# Patient Record
Sex: FEMALE | Race: WHITE | NOT HISPANIC OR LATINO | Employment: FULL TIME | URBAN - METROPOLITAN AREA
[De-identification: names, ages, dates, MRNs, and addresses within clinical notes are randomized per-mention and may not be internally consistent; named-entity substitution may affect disease eponyms.]

---

## 2018-03-06 ENCOUNTER — HOSPITAL ENCOUNTER (OUTPATIENT)
Dept: MAMMOGRAPHY | Facility: CLINIC | Age: 23
Discharge: HOME | End: 2018-03-06
Attending: OBSTETRICS & GYNECOLOGY | Admitting: OBSTETRICS & GYNECOLOGY
Payer: COMMERCIAL

## 2018-03-06 DIAGNOSIS — N63.10 LUMP OF RIGHT BREAST: ICD-10-CM

## 2018-03-06 PROCEDURE — 76642 ULTRASOUND BREAST LIMITED: CPT | Mod: RT

## 2020-03-06 LAB
BLD GP AB SCN SERPL QL: NORMAL
HBV SURFACE AG SERPL QL IA: NON REACTIVE
HIV 1+2 AB+HIV1 P24 AG SERPL QL IA: NORMAL
RUBELLA ANTIBODY IGG QUANTITATIVE: NORMAL IU/ML

## 2020-07-15 LAB — GROUP B STREP PCR: NEGATIVE

## 2020-07-20 DIAGNOSIS — Z34.90 ENCOUNTER FOR INDUCTION OF LABOR: ICD-10-CM

## 2020-07-20 PROCEDURE — U0003 INFECTIOUS AGENT DETECTION BY NUCLEIC ACID (DNA OR RNA); SEVERE ACUTE RESPIRATORY SYNDROME CORONAVIRUS 2 (SARS-COV-2) (CORONAVIRUS DISEASE [COVID-19]), AMPLIFIED PROBE TECHNIQUE, MAKING USE OF HIGH THROUGHPUT TECHNOLOGIES AS DESCRIBED BY CMS-2020-01-R: HCPCS | Performed by: PHYSICIAN ASSISTANT

## 2020-07-21 LAB
SARS-COV-2 RNA SPEC QL NAA+PROBE: NOT DETECTED
SPECIMEN SOURCE: NORMAL

## 2020-07-23 ENCOUNTER — HOSPITAL ENCOUNTER (INPATIENT)
Facility: CLINIC | Age: 25
LOS: 2 days | Discharge: HOME | End: 2020-07-26
Attending: OBSTETRICS & GYNECOLOGY | Admitting: OBSTETRICS & GYNECOLOGY
Payer: COMMERCIAL

## 2020-07-23 LAB
ABO + RH BLD: NORMAL
ABO + RH BLD: NORMAL
AMPHETAMINES UR QL SCN: NEGATIVE
CANNABINOIDS UR QL: NEGATIVE
COCAINE UR QL: NEGATIVE
OPIATES UR QL SCN: NEGATIVE
PCP UR QL SCN: NEGATIVE
SPECIMEN EXP DATE BLD: NORMAL

## 2020-07-23 PROCEDURE — 86780 TREPONEMA PALLIDUM: CPT | Performed by: PHYSICIAN ASSISTANT

## 2020-07-23 PROCEDURE — U0003 INFECTIOUS AGENT DETECTION BY NUCLEIC ACID (DNA OR RNA); SEVERE ACUTE RESPIRATORY SYNDROME CORONAVIRUS 2 (SARS-COV-2) (CORONAVIRUS DISEASE [COVID-19]), AMPLIFIED PROBE TECHNIQUE, MAKING USE OF HIGH THROUGHPUT TECHNOLOGIES AS DESCRIBED BY CMS-2020-01-R: HCPCS | Performed by: OBSTETRICS & GYNECOLOGY

## 2020-07-23 PROCEDURE — 86900 BLOOD TYPING SEROLOGIC ABO: CPT | Performed by: PHYSICIAN ASSISTANT

## 2020-07-23 PROCEDURE — 86592 SYPHILIS TEST NON-TREP QUAL: CPT | Performed by: PHYSICIAN ASSISTANT

## 2020-07-23 PROCEDURE — 36415 COLL VENOUS BLD VENIPUNCTURE: CPT | Performed by: PHYSICIAN ASSISTANT

## 2020-07-23 PROCEDURE — 80307 DRUG TEST PRSMV CHEM ANLYZR: CPT | Performed by: OBSTETRICS & GYNECOLOGY

## 2020-07-23 PROCEDURE — 86901 BLOOD TYPING SEROLOGIC RH(D): CPT | Performed by: PHYSICIAN ASSISTANT

## 2020-07-23 PROCEDURE — 25000132 ZZH RX MED GY IP 250 OP 250 PS 637: Performed by: PHYSICIAN ASSISTANT

## 2020-07-23 RX ORDER — IBUPROFEN 400 MG/1
800 TABLET, FILM COATED ORAL
Status: DISCONTINUED | OUTPATIENT
Start: 2020-07-23 | End: 2020-07-24

## 2020-07-23 RX ORDER — NALOXONE HYDROCHLORIDE 0.4 MG/ML
.1-.4 INJECTION, SOLUTION INTRAMUSCULAR; INTRAVENOUS; SUBCUTANEOUS
Status: DISCONTINUED | OUTPATIENT
Start: 2020-07-23 | End: 2020-07-24

## 2020-07-23 RX ORDER — ESCITALOPRAM OXALATE 5 MG/1
TABLET ORAL
COMMUNITY
Start: 2020-05-06

## 2020-07-23 RX ORDER — VITAMIN A ACETATE, .BETA.-CAROTENE, ASCORBIC ACID, CHOLECALCIFEROL, .ALPHA.-TOCOPHEROL ACETATE, DL-, THIAMINE MONONITRATE, RIBOFLAVIN, NIACINAMIDE, PYRIDOXINE HYDROCHLORIDE, FOLIC ACID, CYANOCOBALAMIN, CALCIUM CARBONATE, FERROUS FUMARATE, ZINC OXIDE, AND CUPRIC OXIDE 2000; 2000; 120; 400; 22; 1.84; 3; 20; 10; 1; 12; 200; 27; 25; 2 [IU]/1; [IU]/1; MG/1; [IU]/1; MG/1; MG/1; MG/1; MG/1; MG/1; MG/1; UG/1; MG/1; MG/1; MG/1; MG/1
1 TABLET ORAL DAILY
COMMUNITY

## 2020-07-23 RX ORDER — TRANEXAMIC ACID 10 MG/ML
1 INJECTION, SOLUTION INTRAVENOUS EVERY 30 MIN PRN
Status: DISCONTINUED | OUTPATIENT
Start: 2020-07-23 | End: 2020-07-24

## 2020-07-23 RX ORDER — VALACYCLOVIR HYDROCHLORIDE 500 MG/1
500 TABLET, FILM COATED ORAL DAILY
Status: DISCONTINUED | OUTPATIENT
Start: 2020-07-24 | End: 2020-07-26 | Stop reason: HOSPADM

## 2020-07-23 RX ORDER — OXYCODONE AND ACETAMINOPHEN 5; 325 MG/1; MG/1
1 TABLET ORAL
Status: DISCONTINUED | OUTPATIENT
Start: 2020-07-23 | End: 2020-07-24

## 2020-07-23 RX ORDER — METHYLERGONOVINE MALEATE 0.2 MG/ML
200 INJECTION INTRAVENOUS
Status: DISCONTINUED | OUTPATIENT
Start: 2020-07-23 | End: 2020-07-24

## 2020-07-23 RX ORDER — VALACYCLOVIR HYDROCHLORIDE 500 MG/1
TABLET, FILM COATED ORAL
COMMUNITY
Start: 2020-01-02

## 2020-07-23 RX ORDER — ZOLPIDEM TARTRATE 5 MG/1
5 TABLET ORAL
Status: DISCONTINUED | OUTPATIENT
Start: 2020-07-23 | End: 2020-07-24

## 2020-07-23 RX ORDER — CARBOPROST TROMETHAMINE 250 UG/ML
250 INJECTION, SOLUTION INTRAMUSCULAR
Status: DISCONTINUED | OUTPATIENT
Start: 2020-07-23 | End: 2020-07-24

## 2020-07-23 RX ORDER — SODIUM CHLORIDE, SODIUM LACTATE, POTASSIUM CHLORIDE, CALCIUM CHLORIDE 600; 310; 30; 20 MG/100ML; MG/100ML; MG/100ML; MG/100ML
INJECTION, SOLUTION INTRAVENOUS CONTINUOUS
Status: DISCONTINUED | OUTPATIENT
Start: 2020-07-23 | End: 2020-07-24

## 2020-07-23 RX ORDER — OXYTOCIN/0.9 % SODIUM CHLORIDE 30/500 ML
100-340 PLASTIC BAG, INJECTION (ML) INTRAVENOUS CONTINUOUS PRN
Status: DISCONTINUED | OUTPATIENT
Start: 2020-07-23 | End: 2020-07-24

## 2020-07-23 RX ORDER — ONDANSETRON 2 MG/ML
4 INJECTION INTRAMUSCULAR; INTRAVENOUS EVERY 6 HOURS PRN
Status: DISCONTINUED | OUTPATIENT
Start: 2020-07-23 | End: 2020-07-24

## 2020-07-23 RX ORDER — OXYTOCIN 10 [USP'U]/ML
10 INJECTION, SOLUTION INTRAMUSCULAR; INTRAVENOUS
Status: DISCONTINUED | OUTPATIENT
Start: 2020-07-23 | End: 2020-07-24

## 2020-07-23 RX ORDER — ACETAMINOPHEN 325 MG/1
650 TABLET ORAL EVERY 4 HOURS PRN
Status: DISCONTINUED | OUTPATIENT
Start: 2020-07-23 | End: 2020-07-24

## 2020-07-23 RX ADMIN — DINOPROSTONE 10 MG: 10 INSERT VAGINAL at 21:24

## 2020-07-23 ASSESSMENT — MIFFLIN-ST. JEOR: SCORE: 1527.86

## 2020-07-24 ENCOUNTER — ANESTHESIA EVENT (OUTPATIENT)
Dept: OBGYN | Facility: CLINIC | Age: 25
End: 2020-07-24
Payer: COMMERCIAL

## 2020-07-24 ENCOUNTER — ANESTHESIA (OUTPATIENT)
Dept: OBGYN | Facility: CLINIC | Age: 25
End: 2020-07-24
Payer: COMMERCIAL

## 2020-07-24 LAB
LABORATORY COMMENT REPORT: NORMAL
RPR SER QL: NONREACTIVE
SARS-COV-2 RNA SPEC QL NAA+PROBE: NEGATIVE
SARS-COV-2 RNA SPEC QL NAA+PROBE: NORMAL
SPECIMEN SOURCE: NORMAL
SPECIMEN SOURCE: NORMAL
T PALLIDUM AB SER QL: REACTIVE

## 2020-07-24 PROCEDURE — 25800030 ZZH RX IP 258 OP 636: Performed by: ANESTHESIOLOGY

## 2020-07-24 PROCEDURE — 3E0P7VZ INTRODUCTION OF HORMONE INTO FEMALE REPRODUCTIVE, VIA NATURAL OR ARTIFICIAL OPENING: ICD-10-PCS | Performed by: OBSTETRICS & GYNECOLOGY

## 2020-07-24 PROCEDURE — 00HU33Z INSERTION OF INFUSION DEVICE INTO SPINAL CANAL, PERCUTANEOUS APPROACH: ICD-10-PCS | Performed by: ANESTHESIOLOGY

## 2020-07-24 PROCEDURE — 72200001 ZZH LABOR CARE VAGINAL DELIVERY SINGLE

## 2020-07-24 PROCEDURE — 0KQM0ZZ REPAIR PERINEUM MUSCLE, OPEN APPROACH: ICD-10-PCS | Performed by: OBSTETRICS & GYNECOLOGY

## 2020-07-24 PROCEDURE — 25000128 H RX IP 250 OP 636: Performed by: ANESTHESIOLOGY

## 2020-07-24 PROCEDURE — 10907ZC DRAINAGE OF AMNIOTIC FLUID, THERAPEUTIC FROM PRODUCTS OF CONCEPTION, VIA NATURAL OR ARTIFICIAL OPENING: ICD-10-PCS | Performed by: OBSTETRICS & GYNECOLOGY

## 2020-07-24 PROCEDURE — 3E0R3BZ INTRODUCTION OF ANESTHETIC AGENT INTO SPINAL CANAL, PERCUTANEOUS APPROACH: ICD-10-PCS | Performed by: ANESTHESIOLOGY

## 2020-07-24 PROCEDURE — 25000125 ZZHC RX 250: Performed by: OBSTETRICS & GYNECOLOGY

## 2020-07-24 PROCEDURE — 25000128 H RX IP 250 OP 636: Performed by: PHYSICIAN ASSISTANT

## 2020-07-24 PROCEDURE — 25000125 ZZHC RX 250: Performed by: PHYSICIAN ASSISTANT

## 2020-07-24 PROCEDURE — 25000132 ZZH RX MED GY IP 250 OP 250 PS 637: Performed by: OBSTETRICS & GYNECOLOGY

## 2020-07-24 PROCEDURE — 37000011 ZZH ANESTHESIA WARD SERVICE

## 2020-07-24 PROCEDURE — 12000035 ZZH R&B POSTPARTUM

## 2020-07-24 PROCEDURE — 25000125 ZZHC RX 250: Performed by: ANESTHESIOLOGY

## 2020-07-24 RX ORDER — OXYTOCIN/0.9 % SODIUM CHLORIDE 30/500 ML
100 PLASTIC BAG, INJECTION (ML) INTRAVENOUS CONTINUOUS
Status: DISCONTINUED | OUTPATIENT
Start: 2020-07-24 | End: 2020-07-26 | Stop reason: HOSPADM

## 2020-07-24 RX ORDER — ROPIVACAINE HYDROCHLORIDE 2 MG/ML
10 INJECTION, SOLUTION EPIDURAL; INFILTRATION; PERINEURAL ONCE
Status: COMPLETED | OUTPATIENT
Start: 2020-07-24 | End: 2020-07-24

## 2020-07-24 RX ORDER — LIDOCAINE 40 MG/G
CREAM TOPICAL
Status: DISCONTINUED | OUTPATIENT
Start: 2020-07-24 | End: 2020-07-24

## 2020-07-24 RX ORDER — TRANEXAMIC ACID 10 MG/ML
1 INJECTION, SOLUTION INTRAVENOUS EVERY 30 MIN PRN
Status: DISCONTINUED | OUTPATIENT
Start: 2020-07-24 | End: 2020-07-26 | Stop reason: HOSPADM

## 2020-07-24 RX ORDER — ONDANSETRON 4 MG/1
4 TABLET, ORALLY DISINTEGRATING ORAL EVERY 6 HOURS PRN
Status: DISCONTINUED | OUTPATIENT
Start: 2020-07-24 | End: 2020-07-24

## 2020-07-24 RX ORDER — CARBOPROST TROMETHAMINE 250 UG/ML
250 INJECTION, SOLUTION INTRAMUSCULAR
Status: DISCONTINUED | OUTPATIENT
Start: 2020-07-24 | End: 2020-07-26 | Stop reason: HOSPADM

## 2020-07-24 RX ORDER — MISOPROSTOL 200 UG/1
800 TABLET ORAL
Status: DISCONTINUED | OUTPATIENT
Start: 2020-07-24 | End: 2020-07-26 | Stop reason: HOSPADM

## 2020-07-24 RX ORDER — OXYTOCIN/0.9 % SODIUM CHLORIDE 30/500 ML
340 PLASTIC BAG, INJECTION (ML) INTRAVENOUS CONTINUOUS PRN
Status: DISCONTINUED | OUTPATIENT
Start: 2020-07-24 | End: 2020-07-26 | Stop reason: HOSPADM

## 2020-07-24 RX ORDER — AMOXICILLIN 250 MG
1 CAPSULE ORAL 2 TIMES DAILY
Status: DISCONTINUED | OUTPATIENT
Start: 2020-07-24 | End: 2020-07-26 | Stop reason: HOSPADM

## 2020-07-24 RX ORDER — IBUPROFEN 400 MG/1
800 TABLET, FILM COATED ORAL EVERY 6 HOURS PRN
Status: DISCONTINUED | OUTPATIENT
Start: 2020-07-24 | End: 2020-07-26 | Stop reason: HOSPADM

## 2020-07-24 RX ORDER — CALCIUM CARBONATE 500 MG/1
1000 TABLET, CHEWABLE ORAL 3 TIMES DAILY PRN
Status: DISCONTINUED | OUTPATIENT
Start: 2020-07-24 | End: 2020-07-26 | Stop reason: HOSPADM

## 2020-07-24 RX ORDER — EPHEDRINE SULFATE 50 MG/ML
5 INJECTION, SOLUTION INTRAMUSCULAR; INTRAVENOUS; SUBCUTANEOUS
Status: DISCONTINUED | OUTPATIENT
Start: 2020-07-24 | End: 2020-07-24

## 2020-07-24 RX ORDER — NALOXONE HYDROCHLORIDE 0.4 MG/ML
.1-.4 INJECTION, SOLUTION INTRAMUSCULAR; INTRAVENOUS; SUBCUTANEOUS
Status: DISCONTINUED | OUTPATIENT
Start: 2020-07-24 | End: 2020-07-26 | Stop reason: HOSPADM

## 2020-07-24 RX ORDER — ONDANSETRON 2 MG/ML
4 INJECTION INTRAMUSCULAR; INTRAVENOUS EVERY 6 HOURS PRN
Status: DISCONTINUED | OUTPATIENT
Start: 2020-07-24 | End: 2020-07-24

## 2020-07-24 RX ORDER — OXYTOCIN/0.9 % SODIUM CHLORIDE 30/500 ML
1-24 PLASTIC BAG, INJECTION (ML) INTRAVENOUS CONTINUOUS
Status: DISCONTINUED | OUTPATIENT
Start: 2020-07-24 | End: 2020-07-24

## 2020-07-24 RX ORDER — HYDROCORTISONE 2.5 %
CREAM (GRAM) TOPICAL 3 TIMES DAILY PRN
Status: DISCONTINUED | OUTPATIENT
Start: 2020-07-24 | End: 2020-07-26 | Stop reason: HOSPADM

## 2020-07-24 RX ORDER — BISACODYL 10 MG
10 SUPPOSITORY, RECTAL RECTAL DAILY PRN
Status: DISCONTINUED | OUTPATIENT
Start: 2020-07-26 | End: 2020-07-26 | Stop reason: HOSPADM

## 2020-07-24 RX ORDER — NALBUPHINE HYDROCHLORIDE 10 MG/ML
2.5-5 INJECTION, SOLUTION INTRAMUSCULAR; INTRAVENOUS; SUBCUTANEOUS EVERY 6 HOURS PRN
Status: DISCONTINUED | OUTPATIENT
Start: 2020-07-24 | End: 2020-07-24

## 2020-07-24 RX ORDER — METHYLERGONOVINE MALEATE 0.2 MG/ML
200 INJECTION INTRAVENOUS
Status: DISCONTINUED | OUTPATIENT
Start: 2020-07-24 | End: 2020-07-26 | Stop reason: HOSPADM

## 2020-07-24 RX ORDER — ACETAMINOPHEN 325 MG/1
650 TABLET ORAL EVERY 4 HOURS PRN
Status: DISCONTINUED | OUTPATIENT
Start: 2020-07-24 | End: 2020-07-26 | Stop reason: HOSPADM

## 2020-07-24 RX ORDER — AMOXICILLIN 250 MG
2 CAPSULE ORAL 2 TIMES DAILY
Status: DISCONTINUED | OUTPATIENT
Start: 2020-07-24 | End: 2020-07-26 | Stop reason: HOSPADM

## 2020-07-24 RX ORDER — LIDOCAINE HYDROCHLORIDE AND EPINEPHRINE 15; 5 MG/ML; UG/ML
INJECTION, SOLUTION EPIDURAL PRN
Status: DISCONTINUED | OUTPATIENT
Start: 2020-07-24 | End: 2020-07-25 | Stop reason: HOSPADM

## 2020-07-24 RX ORDER — OXYTOCIN 10 [USP'U]/ML
10 INJECTION, SOLUTION INTRAMUSCULAR; INTRAVENOUS
Status: DISCONTINUED | OUTPATIENT
Start: 2020-07-24 | End: 2020-07-26 | Stop reason: HOSPADM

## 2020-07-24 RX ORDER — MODIFIED LANOLIN
OINTMENT (GRAM) TOPICAL
Status: DISCONTINUED | OUTPATIENT
Start: 2020-07-24 | End: 2020-07-26 | Stop reason: HOSPADM

## 2020-07-24 RX ORDER — NALOXONE HYDROCHLORIDE 0.4 MG/ML
.1-.4 INJECTION, SOLUTION INTRAMUSCULAR; INTRAVENOUS; SUBCUTANEOUS
Status: DISCONTINUED | OUTPATIENT
Start: 2020-07-24 | End: 2020-07-24

## 2020-07-24 RX ADMIN — LIDOCAINE HYDROCHLORIDE AND EPINEPHRINE 3 ML: 15; 5 INJECTION, SOLUTION EPIDURAL at 09:09

## 2020-07-24 RX ADMIN — Medication 12 ML/HR: at 09:04

## 2020-07-24 RX ADMIN — ACETAMINOPHEN 650 MG: 325 TABLET, FILM COATED ORAL at 21:23

## 2020-07-24 RX ADMIN — CALCIUM CARBONATE (ANTACID) CHEW TAB 500 MG 1000 MG: 500 CHEW TAB at 14:43

## 2020-07-24 RX ADMIN — Medication 100 ML/HR: at 17:33

## 2020-07-24 RX ADMIN — DOCUSATE SODIUM 50 MG AND SENNOSIDES 8.6 MG 1 TABLET: 8.6; 5 TABLET, FILM COATED ORAL at 21:23

## 2020-07-24 RX ADMIN — ROPIVACAINE HYDROCHLORIDE 10 ML: 2 INJECTION, SOLUTION EPIDURAL; INFILTRATION at 09:14

## 2020-07-24 RX ADMIN — IBUPROFEN 800 MG: 400 TABLET ORAL at 17:32

## 2020-07-24 RX ADMIN — ONDANSETRON 4 MG: 2 INJECTION INTRAMUSCULAR; INTRAVENOUS at 13:18

## 2020-07-24 RX ADMIN — ONDANSETRON 4 MG: 2 INJECTION INTRAMUSCULAR; INTRAVENOUS at 07:05

## 2020-07-24 RX ADMIN — SODIUM CHLORIDE, POTASSIUM CHLORIDE, SODIUM LACTATE AND CALCIUM CHLORIDE 1000 ML: 600; 310; 30; 20 INJECTION, SOLUTION INTRAVENOUS at 09:03

## 2020-07-24 RX ADMIN — VALACYCLOVIR HYDROCHLORIDE 500 MG: 500 TABLET, FILM COATED ORAL at 09:28

## 2020-07-24 RX ADMIN — Medication 2 MILLI-UNITS/MIN: at 13:02

## 2020-07-24 RX ADMIN — ACETAMINOPHEN 650 MG: 325 TABLET, FILM COATED ORAL at 17:32

## 2020-07-24 NOTE — PLAN OF CARE
Pitocin initiated at approx 1300 for abnormal uterine contraction pattern with pt verbal consent. Pt verbalized understanding of plan of care. Cont to monitor and assess.

## 2020-07-24 NOTE — ANESTHESIA PROCEDURE NOTES
Procedure note : epidural catheter  Staff -   Anesthesiologist:  Bola Fuentes MD      Performed By: anesthesiologist        Pre-Procedure  Performed by Bola Fuentes MD  Location: OB    Procedure Times:7/24/2020 8:53 AM and 7/24/2020 9:14 AM  Pre-Anesthestic Checklist: patient identified, IV checked, risks and benefits discussed, informed consent, monitors and equipment checked, pre-op evaluation and at physician/surgeon's request    Timeout  Correct Patient: Yes   Correct Procedure: Yes   Correct Site: Yes   Correct Laterality: N/A   Correct Position: Yes   Site Marked: N/A   .   Procedure Documentation    Diagnosis:Labor Pain.    Procedure: epidural catheter, .   Patient Position:sitting Insertion Site:L3-4  (midline approach) Injection technique: LORT saline   Local skin infiltrated with 3 mL of 1% lidocaine.      Patient Prep/Sterile Barriers; mask, sterile gloves, chlorhexidine gluconate and isopropyl alcohol, patient draped.  .  Needle: Touhy needle   Needle Gauge: 17.    Needle Length (Inches) 3.5   # of attempts: 1 and # of redirects: : 0. .    Catheter: 19 G . .  Catheter threaded easily  4 cm epidural space.  12 cm at skin.   .    Assessment/Narrative  Paresthesias: No.  .  .  Aspiration negative for heme or CSF  . Test dose of 3 mL lidocaine 1.5% w/ 1:200,000 epinephrine at. Test dose negative for signs of intravascular, subdural or intrathecal injection. Comments:  Pt tolerated well.    Returned to supine with JEAN CLAUDE post-procedure.   FHTs checked and stable post-procedure.    PCEA Infusion verified with OB RN prior to leaving room.

## 2020-07-24 NOTE — PLAN OF CARE
Assumed care of patient at approx 0715. Pt breathing through uterine contractions, up ad lissy in room, utilizing birthing ball and standing at bedside. Dr Werner at bedside at 0820 for assessment and AROM. Moderate amount clear fluid noted. SVE 3/80/-2, posterior. Pt requesting epidural and becoming more uncomfortable. Bolus started. Dr Fuentes at bedside for epidural placement. Pt tolerated well and received good pain relief. Guerrero cath placed, clear urine output.  SVE at 1030, 4-5/80/-1. Mother supportive at bedside. Dr Werner updated at 1045 re: SVE and reactive treponema antibody lab result. Cont to monitor and assess.

## 2020-07-24 NOTE — H&P
"Labor and delivery admit note.  HPI  Cierra Suazo  is a 24 year old   who was admitted with for IOL secondary to polyhydramnios at term.     Estimated Date of Delivery: 2020  39w3d  Prenatal care - early and adequate with Dr. Werner, dated by LMP c/w first trimester USG    Prenatal course - complicated by polyhydramnios    Prenatal labs  Blood type O, Rh positive  HIV-negative  Hepatitis BsAg- negative  Trep AB- Negative  Rubella- Immune  Pap- normal  GC/Chlamydia- negative  Quad screen- normal  Glucola- normal   GBS- negative.    Past Medical History:   Diagnosis Date     Depressive disorder     anxiety     Herpes genitalia      Past Surgical History:   Procedure Laterality Date     BREAST SURGERY  2019    augmentation     Social History     Tobacco Use     Smoking status: Never Smoker     Smokeless tobacco: Never Used   Substance Use Topics     Alcohol use: Not Currently     Drug use: Not Currently       Objective  Alert and oriented  /83   Pulse (!) 46   Temp 99.9  F (37.7  C) (Temporal)   Resp 14   Ht 1.676 m (5' 6\")   Wt 77.1 kg (170 lb)   SpO2 98%   BMI 27.44 kg/m    Heart and Lungs- normal  PA- uterus full term.   FHR- 130 baseline, good variability, accels +. No decels  Pelvic ( by admitting RN )  Cx: closed/long/high    Assessment/Plan:   at 39 weeks - IOL for polyhydramnios  Uneventful prenatal course.    Cervical ripening and AROM/pitocin  Routine intrapartum management.    Jailyn Werner MD  "

## 2020-07-24 NOTE — ANESTHESIA PREPROCEDURE EVALUATION
"Anesthesia Pre-Procedure Evaluation    Patient: Cierra Suazo   MRN: [id number] : 1995          Preoperative Diagnosis: * No surgery found *        Past Medical History:   Diagnosis Date     Depressive disorder     anxiety     Herpes genitalia      Past Surgical History:   Procedure Laterality Date     BREAST SURGERY  2019    augmentation       Anesthesia Evaluation     .             ROS/MED HX    ENT/Pulmonary:  - neg pulmonary ROS     Neurologic:  - neg neurologic ROS     Cardiovascular:  - neg cardiovascular ROS       METS/Exercise Tolerance:     Hematologic:  - neg hematologic  ROS       Musculoskeletal:  - neg musculoskeletal ROS       GI/Hepatic:  - neg GI/hepatic ROS       Renal/Genitourinary:  - ROS Renal section negative       Endo:  - neg endo ROS       Psychiatric:  - neg psychiatric ROS       Infectious Disease:         Malignancy:         Other:                          Physical Exam      Airway   Mallampati: II    Dental     Cardiovascular       Pulmonary             No results found for: WBC, HGB, HCT, PLT, CRP, SED, NA, POTASSIUM, CHLORIDE, CO2, BUN, CR, GLC, ALEXUS, PHOS, MAG, ALBUMIN, PROTTOTAL, ALT, AST, GGT, ALKPHOS, BILITOTAL, BILIDIRECT, LIPASE, AMYLASE, MEGHNA, PTT, INR, FIBR, TSH, T4, T3, HCG, HCGS, CKTOTAL, CKMB, TROPN    Preop Vitals  BP Readings from Last 3 Encounters:   20 109/76    Pulse Readings from Last 3 Encounters:   20 62      Resp Readings from Last 3 Encounters:   20 14    SpO2 Readings from Last 3 Encounters:   No data found for SpO2      Temp Readings from Last 1 Encounters:   20 36.2  C (97.2  F) (Temporal)    Ht Readings from Last 1 Encounters:   20 1.676 m (5' 6\")      Wt Readings from Last 1 Encounters:   20 77.1 kg (170 lb)    Estimated body mass index is 27.44 kg/m  as calculated from the following:    Height as of this encounter: 1.676 m (5' 6\").    Weight as of this encounter: 77.1 kg (170 lb).       Anesthesia " Plan      History & Physical Review      ASA Status:  2 .  OB Epidural Asa: 2       Plan for Epidural            Postoperative Care      Consents  Anesthetic plan, risks, benefits and alternatives discussed with:  Patient..                 Bola Fuentes MD

## 2020-07-24 NOTE — L&D DELIVERY NOTE
Admission date: 2020  Delivery date:  20  Place of delivery: [facility]    The patient is a 24 year old  at 39w3d  wks gestation admitted to labor and delivery for IOL secondary to polyhydramnios.  Her  course was otherwise uncomplicated.  The estimate fetal weight is 7.5#.      She received cervidil for cervical ripening.  She had AROM for clear fluid at 8:30 am,  For pain management she had an epidural with good relief.  Pitocin was titrated per protocol.       She progressed to complete dilation at 3:30 am.  She had excellent maternal expulsive efforts, and at 4:29 pm she delivered a viable male infant weighing (not yet weighed) with Apgars of 7 at 1 min and 8 at 5 minutes, via a normal spontaneous vaginal delivery over an intact perineum.  The infant was vigorous, and mouth and nose were bulb suctioned upon delivery.  The infant was handed off to his parents and the nursery team in attendance.    The placenta delivered spontaneously and intact.  The uterus was made firm with IV pitocin and uterine massage.  The estimated blood loss was 150 mL.    The cervix and vagina were inspected.  There was a  second degree perineal laceration which was repaired with 3-0 vicryl assuring hemostasis and close approximation.  The patient tolerated this well.     During labor the fetal heart tones were category 2.    Mother and baby did well and went to normal postpartum and  nursery.    GBS was negative.     Jailyn Werner MD

## 2020-07-24 NOTE — PROVIDER NOTIFICATION
07/23/20 2050   Provider Notification   Provider Name/Title Dr Werner   Method of Notification Electronic Page   Notification Reason Patient Arrived     Dr Werner called the unit and spoke to this RN. She is the patients primary MD and is familiar with the patient. Updated her on information including but not limited to: the patients contraction pattern and FHT.   Orders that were signed and held were released. Additional orders obtained to saline lock the patient overnight while cervical ripening and valtrex for the patient to take in the AM while she is still pregnant.   Will page MD overnight if needed.

## 2020-07-24 NOTE — PLAN OF CARE
Data: Patient admitted to room 214 at 1942. Patient is a . Prenatal record reviewed. Her medical history includes anxiety (reports not currently taking medication for it), genital herpes (taking valtrex preventatively), and a breast augmentation.   Gestational age 39w2d. Vital signs per doc flowsheet. Fetal movement present. Patient reports Induction Of Labor (cervical ripening)   as reason for admission. Her mother will be her support person and will be here tomorrow morning.   Action:  Verbal consent for EFM, external fetal monitors applied. Admission assessment completed. Patient and support persons educated on labor process. Patient instructed to report change in fetal movement, contractions, vaginal leaking of fluid or bleeding, abdominal pain, or any concerns related to the pregnancy to her nurse/physician. Patient oriented to room, call light in reach.   Response: Dr. Werner informed of patients arrival. Plan per provider is cervidil. Patient verbalized understanding of education and verbalized agreement with plan. Patient plans to get an epidural for pain management when the time comes.

## 2020-07-25 PROCEDURE — 12000035 ZZH R&B POSTPARTUM

## 2020-07-25 PROCEDURE — 25000132 ZZH RX MED GY IP 250 OP 250 PS 637: Performed by: OBSTETRICS & GYNECOLOGY

## 2020-07-25 RX ORDER — ACETAMINOPHEN 325 MG/1
650 TABLET ORAL EVERY 4 HOURS PRN
Qty: 1 BOTTLE | Refills: 0 | Status: SHIPPED | OUTPATIENT
Start: 2020-07-25

## 2020-07-25 RX ORDER — IBUPROFEN 800 MG/1
800 TABLET, FILM COATED ORAL EVERY 6 HOURS PRN
Qty: 30 TABLET | Refills: 0 | Status: SHIPPED | OUTPATIENT
Start: 2020-07-25

## 2020-07-25 RX ADMIN — DOCUSATE SODIUM 50 MG AND SENNOSIDES 8.6 MG 1 TABLET: 8.6; 5 TABLET, FILM COATED ORAL at 21:00

## 2020-07-25 RX ADMIN — IBUPROFEN 800 MG: 400 TABLET ORAL at 20:59

## 2020-07-25 RX ADMIN — ACETAMINOPHEN 650 MG: 325 TABLET, FILM COATED ORAL at 02:02

## 2020-07-25 RX ADMIN — VALACYCLOVIR HYDROCHLORIDE 500 MG: 500 TABLET, FILM COATED ORAL at 09:00

## 2020-07-25 RX ADMIN — ACETAMINOPHEN 650 MG: 325 TABLET, FILM COATED ORAL at 21:00

## 2020-07-25 RX ADMIN — ACETAMINOPHEN 650 MG: 325 TABLET, FILM COATED ORAL at 08:59

## 2020-07-25 RX ADMIN — IBUPROFEN 800 MG: 400 TABLET ORAL at 08:59

## 2020-07-25 RX ADMIN — IBUPROFEN 800 MG: 400 TABLET ORAL at 14:57

## 2020-07-25 RX ADMIN — DOCUSATE SODIUM 50 MG AND SENNOSIDES 8.6 MG 1 TABLET: 8.6; 5 TABLET, FILM COATED ORAL at 09:00

## 2020-07-25 RX ADMIN — ACETAMINOPHEN 650 MG: 325 TABLET, FILM COATED ORAL at 14:57

## 2020-07-25 RX ADMIN — IBUPROFEN 800 MG: 400 TABLET ORAL at 00:09

## 2020-07-25 NOTE — PLAN OF CARE
Vital signs stable. Up ad lissy. Voiding without difficulties. Bleeding wnl. Using ice and tucks. Taking tylenol and ibuprofen for pain control. Breast feeding improving with nipple shield. Encouraged to call with any questions or concerns. Will continue to monitor.

## 2020-07-25 NOTE — LACTATION NOTE
Initial Lactation visit with Cierra & baby boy. Primary RN asked LC to check in to assist with feeding. At time of visit, Cierra holding baby at right breast in cradle hold. He was eager, alert and attempting to latch, but unable to maintain nutritive suck and good latch. LC assisted with holding breast, bringing baby more deeply to breast. He was able to take a few sucks, but unable to maintain latch. Noted Cierra has smooth, small nipples that do not come out with infant at breast attempting to latch. LC discussed trying nipple shield and Cierra in agreement to try. 24mm shield introduced at right breast and with encouragement, baby was able to latch with lips flanged and began a deep, nutritive suck pattern. Discussed signs of a good latch, encouraged Cierra to hold him deeply to breast to ensure good latch. Discussed general positioning techniques. He fed with some stimulation through remainder of visit, and was still feeding well when LC left room. Cierra smiling, very appreciative of assistance and that baby feeding well at this time.    Recommend unlimited, frequent breast feedings: At least 8 - 12 times every 24 hours. Recommended rooming in. Instructed in hand expression. Avoid pacifiers and supplementation with formula unless medically indicated. Explained benefits of holding baby skin on skin to help promote better breastfeeding outcomes. Will revisit as needed.    Annie Lu, RN-C, IBCLC, MNN, PHN, BSN

## 2020-07-25 NOTE — PLAN OF CARE
Vital signs stable. Postpartum assessment WDL. Pain controlled with tylenol & toradol. Patient voiding without difficulty. Breastfeeding on cue with assist. Patient and infant bonding well. Will continue with current plan of care.

## 2020-07-25 NOTE — ANESTHESIA POSTPROCEDURE EVALUATION
Patient: Cierra Suazo    * No procedures listed *    Diagnosis:* No pre-op diagnosis entered *  Diagnosis Additional Information: No value filed.    Anesthesia Type:  No value filed.    Note:  Anesthesia Post Evaluation    Patient location during evaluation: Floor (Postpartum Unit)  Patient participation: Able to fully participate in evaluation  Level of consciousness: awake and alert  Pain management: adequate  Airway patency: patent  Cardiovascular status: acceptable and hemodynamically stable  Respiratory status: acceptable, spontaneous ventilation and unassisted  Hydration status: acceptable  PONV: none       Comments: Pt denies epidural-related complaints.         Last vitals:  Vitals:    07/24/20 1830 07/24/20 2100 07/25/20 0853   BP: 125/76 113/73 114/80   Pulse:  67 77   Resp:   18   Temp:  37.1  C (98.8  F) 36.7  C (98  F)   SpO2:            Electronically Signed By: Priti Lugo MD  July 25, 2020  11:44 AM

## 2020-07-25 NOTE — PLAN OF CARE
Data: Cierra Suazo transferred to room 427 via wheelchair at 1900. Baby transferred via parent's arms.  Action: Receiving unit notified of transfer: Yes. Patient and family notified of room change. Report given to Jaz TIERNEY at 1900. Belongings sent to receiving unit. Accompanied by Registered Nurse. Oriented patient to surroundings. Call light within reach. ID bands double-checked with receiving RN.  Response: Patient tolerated transfer and is stable.

## 2020-07-25 NOTE — PLAN OF CARE
Pt's VSS, taking Tylenol and Ibuprofen for pain control.  Pt voiding adequate amounts, bleeding appropriate.  Pt working on breastfeeding infant with assist and encouragement from nurse.  Discussed with pt to talk to her Dr about possibly getting on something for her anxiety post partum, pt agreeable with conversation.  Encouraging pt ambulate to the bathroom on her own to do lucy care.  Will continue to monitor.

## 2020-07-25 NOTE — PROGRESS NOTES
[facility]       DAILY NOTE - POSTPARTUM DAY 1     SUBJECTIVE:     Pain controlled? Yes  Tolerating a regular diet? YES  Ambulating? YES  Voiding without difficulty? Yes    OBJECTIVE:  Vitals:    20 1800 20 1815 20 1830 20 2100   BP: 128/85 127/84 125/76 113/73   Pulse:    67   Resp:       Temp:    98.8  F (37.1  C)   TempSrc:    Oral   SpO2:       Weight:       Height:           Constitutional: healthy, alert and no distress    Abdomen:  Uterine fundus is firm, non-tender and at the level of the umbilicus     Incision: Healing well      LABS:  No results found for: HGB    ASSESSMENT:  Post-partum day #1 s/p   Pregnancy complicated by + treponema, - RPR    Doing well.       PLAN:   Discharge today.  Return to clinic in 2 for repeat FTA and again in 6 weeks.  Continue routine postpartum cares    Tad Pastor MD

## 2020-07-26 VITALS
BODY MASS INDEX: 27.32 KG/M2 | TEMPERATURE: 98 F | RESPIRATION RATE: 16 BRPM | DIASTOLIC BLOOD PRESSURE: 75 MMHG | HEIGHT: 66 IN | OXYGEN SATURATION: 98 % | SYSTOLIC BLOOD PRESSURE: 110 MMHG | WEIGHT: 170 LBS | HEART RATE: 55 BPM

## 2020-07-26 PROCEDURE — 25000132 ZZH RX MED GY IP 250 OP 250 PS 637: Performed by: OBSTETRICS & GYNECOLOGY

## 2020-07-26 RX ADMIN — IBUPROFEN 800 MG: 400 TABLET ORAL at 08:49

## 2020-07-26 RX ADMIN — DOCUSATE SODIUM 50 MG AND SENNOSIDES 8.6 MG 2 TABLET: 8.6; 5 TABLET, FILM COATED ORAL at 08:48

## 2020-07-26 RX ADMIN — IBUPROFEN 800 MG: 400 TABLET ORAL at 03:08

## 2020-07-26 RX ADMIN — ACETAMINOPHEN 650 MG: 325 TABLET, FILM COATED ORAL at 08:49

## 2020-07-26 RX ADMIN — VALACYCLOVIR HYDROCHLORIDE 500 MG: 500 TABLET, FILM COATED ORAL at 08:49

## 2020-07-26 RX ADMIN — ACETAMINOPHEN 650 MG: 325 TABLET, FILM COATED ORAL at 03:08

## 2020-07-26 NOTE — LACTATION NOTE
Routine visit. Cierra is discharging home today with her baby. She states breastfeeding is going well with a shield. She states she's happy with how well things are going. Recommended she continue using infant feeding log to track feedings, voids and stools and plan to follow up with  at [facility] d/t shield use. Cierra appreciative of my visit.

## 2020-07-26 NOTE — PLAN OF CARE
Patient discharged home per orders. Discharge instructions reviewed with patient by Sarai Ruiz RN, patient states understanding, paperwork signed. Discharge prescriptions given to patient, paper signed. No further questions or concerns.

## 2020-07-26 NOTE — PLAN OF CARE
Patient taking Tylenol and Ibuprofen for pain with adequate pain relief. Patient ready for discharge per orders. All education complete.

## 2020-07-26 NOTE — PROGRESS NOTES
Dr. Day larkin confirmatory treponema testing was done at [facility] and she is clear of the diagnosis. Ok to RTC at 6 weeks. Ready for discharge today

## 2020-07-26 NOTE — PLAN OF CARE
Pt's VSS, up ambulating independently, voiding without difficulty. Fundus is firm, scant lochia. Pain managed with ibuprofen and tylenol. Pt breastfeeding infant well with shield. Mother bonding well with infant.

## 2020-07-27 LAB — T PALLIDUM AB SER QL AGGL: NON REACTIVE

## 2021-02-08 ENCOUNTER — TRANSFERRED RECORDS (OUTPATIENT)
Dept: HEALTH INFORMATION MANAGEMENT | Facility: CLINIC | Age: 26
End: 2021-02-08
Payer: COMMERCIAL

## 2021-02-22 ENCOUNTER — MEDICAL CORRESPONDENCE (OUTPATIENT)
Dept: HEALTH INFORMATION MANAGEMENT | Facility: CLINIC | Age: 26
End: 2021-02-22
Payer: COMMERCIAL

## 2021-06-28 ENCOUNTER — TELEPHONE (OUTPATIENT)
Dept: DERMATOLOGY | Facility: CLINIC | Age: 26
End: 2021-06-28

## 2021-06-28 NOTE — TELEPHONE ENCOUNTER
[facility] Call Center    Phone Message    May a detailed message be left on voicemail: yes     Reason for Call: Other: Pt called and will need to r/s her Aug appt with Dr. Pierson. Please call her back to r/s. Pt also request that she be put on a waitlist in case someone cancels their appt. Thanks      Action Taken: Message routed to:  [facility] DERM    Travel Screening: Not Applicable

## 2021-07-06 NOTE — TELEPHONE ENCOUNTER
[facility] Call Center    Phone Message    May a detailed message be left on voicemail: yes     Reason for Call: Other: Pt called regarding below. Pt has not heard within the 24 hour james. I checked and there were no templates available for Dr. Pierson. Please call the pt back. Thanks    Action Taken: Message routed to:  [facility] DERM    Travel Screening: Not Applicable

## 2021-07-09 NOTE — TELEPHONE ENCOUNTER
[facility] Call Center    Phone Message    May a detailed message be left on voicemail: yes     Reason for Call: Appointment Intake    Referring Provider Name: NA  Diagnosis and/or Symptoms: Pt is waiting to get scheduled with Dr. Pireson and would like to be seen soon. Pt was scheduled for Aug. 26 and the Appt was cancelled due to provider not in office. Protocols state to not schedule new Pt's for HS. Pt is stated she has Morphea. Dr. Pierson has no openings on the schedule.  I am not sure of Dx and Pt would like to schedule soon.  Please call Pt ASAP to schedule. Thank you    Action Taken: Message routed to:  [facility] Derm    Travel Screening: Not Applicable

## 2021-07-13 ENCOUNTER — TELEPHONE (OUTPATIENT)
Dept: DERMATOLOGY | Facility: CLINIC | Age: 26
End: 2021-07-13

## 2021-07-13 NOTE — TELEPHONE ENCOUNTER
[facility] Call Center    Phone Message    May a detailed message be left on voicemail: yes     Reason for Call: Other: Pt called and said that she has not heard back from anyone regarding this within the 24 hour james. Pt was wondering if there were any early cancellations. Please call the pt back if there were any or not. Thanks    Action Taken: Message routed to:  [facility] DERM    Travel Screening: Not Applicable

## 2021-07-15 NOTE — TELEPHONE ENCOUNTER
Cierra referred provider called the back line to see if cierra can be seen sooner. I informed her that Dr. Pierson is out to March for HS. Cierra provider notes that Cierra does not have HS so she is unsure why she was being scheduled for this, Cierra has Morphea. Cierra was also scheduled with Dr. Palomares who also sees for morphea . I cancelled her appointment with Dr. Pierson and kept the appointment with Dr. Palomares.    IZZY Fay

## 2021-07-20 ENCOUNTER — TRANSFERRED RECORDS (OUTPATIENT)
Dept: HEALTH INFORMATION MANAGEMENT | Facility: CLINIC | Age: 26
End: 2021-07-20
Payer: COMMERCIAL

## 2021-07-20 ENCOUNTER — LAB REQUISITION (OUTPATIENT)
Dept: LAB | Facility: CLINIC | Age: 26
End: 2021-07-20
Payer: COMMERCIAL

## 2021-07-20 DIAGNOSIS — L94.0 LOCALIZED SCLERODERMA (MORPHEA): ICD-10-CM

## 2021-07-20 PROCEDURE — 36415 COLL VENOUS BLD VENIPUNCTURE: CPT | Mod: ORL | Performed by: DERMATOLOGY

## 2021-07-20 PROCEDURE — 85025 COMPLETE CBC W/AUTO DIFF WBC: CPT | Mod: ORL | Performed by: DERMATOLOGY

## 2021-07-20 PROCEDURE — 80076 HEPATIC FUNCTION PANEL: CPT | Mod: ORL | Performed by: DERMATOLOGY

## 2021-07-21 LAB
ALBUMIN SERPL-MCNC: 4.1 G/DL (ref 3.4–5)
ALP SERPL-CCNC: 51 U/L (ref 40–150)
ALT SERPL W P-5'-P-CCNC: 22 U/L (ref 0–50)
AST SERPL W P-5'-P-CCNC: 19 U/L (ref 0–45)
BASOPHILS # BLD AUTO: 0.1 10E3/UL (ref 0–0.2)
BASOPHILS NFR BLD AUTO: 1 %
BILIRUB DIRECT SERPL-MCNC: 0.2 MG/DL (ref 0–0.2)
BILIRUB SERPL-MCNC: 0.9 MG/DL (ref 0.2–1.3)
EOSINOPHIL # BLD AUTO: 0.1 10E3/UL (ref 0–0.7)
EOSINOPHIL NFR BLD AUTO: 2 %
ERYTHROCYTE [DISTWIDTH] IN BLOOD BY AUTOMATED COUNT: 14.5 % (ref 10–15)
HCT VFR BLD AUTO: 41.6 % (ref 35–47)
HGB BLD-MCNC: 13.2 G/DL (ref 11.7–15.7)
IMM GRANULOCYTES # BLD: 0 10E3/UL
IMM GRANULOCYTES NFR BLD: 0 %
LYMPHOCYTES # BLD AUTO: 2.4 10E3/UL (ref 0.8–5.3)
LYMPHOCYTES NFR BLD AUTO: 28 %
MCH RBC QN AUTO: 28 PG (ref 26.5–33)
MCHC RBC AUTO-ENTMCNC: 31.7 G/DL (ref 31.5–36.5)
MCV RBC AUTO: 88 FL (ref 78–100)
MONOCYTES # BLD AUTO: 0.7 10E3/UL (ref 0–1.3)
MONOCYTES NFR BLD AUTO: 8 %
NEUTROPHILS # BLD AUTO: 5.3 10E3/UL (ref 1.6–8.3)
NEUTROPHILS NFR BLD AUTO: 61 %
NRBC # BLD AUTO: 0 10E3/UL
NRBC BLD AUTO-RTO: 0 /100
PLATELET # BLD AUTO: 346 10E3/UL (ref 150–450)
PROT SERPL-MCNC: 7.8 G/DL (ref 6.8–8.8)
RBC # BLD AUTO: 4.72 10E6/UL (ref 3.8–5.2)
WBC # BLD AUTO: 8.6 10E3/UL (ref 4–11)

## 2021-09-28 ENCOUNTER — OFFICE VISIT (OUTPATIENT)
Dept: DERMATOLOGY | Facility: CLINIC | Age: 26
End: 2021-09-28
Payer: COMMERCIAL

## 2021-09-28 DIAGNOSIS — L30.9 DERMATITIS: ICD-10-CM

## 2021-09-28 DIAGNOSIS — L94.0 MORPHEA: Primary | ICD-10-CM

## 2021-09-28 PROCEDURE — 99203 OFFICE O/P NEW LOW 30 MIN: CPT | Mod: GC | Performed by: DERMATOLOGY

## 2021-09-28 RX ORDER — CLOBETASOL PROPIONATE 0.5 MG/G
CREAM TOPICAL 2 TIMES DAILY
COMMUNITY
Start: 2021-07-23

## 2021-09-28 RX ORDER — FOLIC ACID 1 MG/1
1000 TABLET ORAL DAILY
COMMUNITY
Start: 2021-09-11

## 2021-09-28 RX ORDER — CALCIPOTRIENE 50 UG/G
CREAM TOPICAL DAILY
COMMUNITY
Start: 2021-02-09 | End: 2021-11-18

## 2021-09-28 RX ORDER — MYCOPHENOLATE MOFETIL 500 MG/1
1000 TABLET ORAL 2 TIMES DAILY
Qty: 90 TABLET | Refills: 3 | Status: SHIPPED | OUTPATIENT
Start: 2021-09-28 | End: 2021-11-18

## 2021-09-28 ASSESSMENT — PAIN SCALES - GENERAL: PAINLEVEL: NO PAIN (0)

## 2021-09-28 NOTE — PATIENT INSTRUCTIONS
1. Morphea  - plan to start UVA.   - plan to start mycophenolate with gradual increase to goal of 1000 mg twice a day.  - discontinue methotrexate.  - get labs: CBC w diff, CMP today  - continue clobetasol and calcipotriene topicals    Return office visit in 2 months.

## 2021-09-28 NOTE — LETTER
9/28/2021       RE: Cierra Suazo  [address]  [place] [address]     Dear Colleague,    Thank you for referring your patient, Cierra Suazo, to the [facility] [place] at [facility]. Please see a copy of my visit note below.    [facility] Dermatology Note  Encounter Date: Sep 28, 2021  Office Visit     Dermatology Problem List:  1. Morphea  - biopsy on the back at derm surg c/w morphea (per patient) 2/2021  - current: plan to start UVA and mycophenolate 1000 BID, cont  clobetasol and calcipotriene.   - prior: methotrexate 20 mg q week (morphea was stable but not improving so transitioned to cellcept today), fillers (would like to continue)    ____________________________________________    Assessment & Plan:     # Morphea  No evidence of skin tightening on the tips of the finger or other features of systemic sclerosis. Agree with prior diagnosis of morphea. If any question about the diagnosis appear, can consider reviewing derm specialist biopsy slides. Was treated with methotrexate 20 mg q week, clobetasol, calcipotriene at derm specialists and morphea remained stable but did not improve. Discussed etiology, natural history, and treatment options of morphea including cell cept and UVA. Discussed risks/benefits of UVA including theoretical increase in skin cancer risk over lifetime. Did not discuss, but could consider filler to help with lip droop. Of note, patient would like to become pregnant in the future, but not in the near future. She will let us know well in advance if she wishes to become pregnant again.   - plan to start UVA1 phototherapy  - plan to start cellcept with gradual increase to goal of 1000 mg BID   - discontinue methotrexate  - f/u CBC w diff, CMP  - cont clobetasol and calcipotriene  - PRN cosmetic derm and oral maxillofacial for lip droop in the future    Procedures Performed:  "  none    Follow-up: 2 month(s) in-person, or earlier for new or changing lesions    Staff and Resident:     Dr Palomares - staff    Destini Hernandez MD PGY-2  Medicine-Dermatology    I have seen and examined this patient and agree with the assessment and plan as documented in the resident's note.    Luis Palomares MD  Dermatology Attending    ____________________________________________    CC: Derm Problem (Cierra is here today for Morphea. She states\" I thought I had birth marks on my back and my jawline is drooping and I am here for a second opinion.\")    HPI:  Ms. Cierra Suazo is a(n) 25 year old female who presents today as a new patient for second opinion on morphea treatment.   Referred from [facility]ology Specialist. First noticed skin changes on her jaw with a droop around late teens/early 20s. Also has back / arms involvement that she thought were birthmarks. Reports increased fatigue but has a 2yo baby. Morphea was maybe a little darker when she was pregant.  Had a punch biopsy on the back with [facility] in 2/2021. They treated her with methotrexate, clobetasol and calcipotriene. Not sure if anything has improved. Skin is stable but not improving she thinks. Denies genital involvement. She is wondering about meds and issues with fertility as she would like more children in the future. Not planning for pregnancy in the near future. Patient is otherwise feeling well, without additional skin concerns. No autoimmune concerns in the family.    Labs Reviewed:  N/A    Physical Exam:  Vitals: There were no vitals taken for this visit.  SKIN: Total skin excluding the undergarment areas was performed. The exam included the head/face, neck, both arms, chest, back, abdomen, both legs, digits and/or nails.   - hyperpigmented firm plaques on the back, abdomen, arms  - firm skin colored plaque on the lower L cutaneous lip causing mild depression  - No other lesions of concern on areas examined.     Medications:  Current " Outpatient Medications   Medication     acetaminophen (TYLENOL) 325 MG tablet     escitalopram (LEXAPRO) 5 MG tablet     ibuprofen (ADVIL/MOTRIN) 800 MG tablet     Prenatal Vit-Fe Fumarate-FA (PNV PRENATAL PLUS MULTIVITAMIN) 27-1 MG TABS per tablet     valACYclovir (VALTREX) 500 MG tablet     No current facility-administered medications for this visit.      Past Medical History:   Patient Active Problem List   Diagnosis     Labor and delivery, indication for care      (normal spontaneous vaginal delivery)     Past Medical History:   Diagnosis Date     Depressive disorder     anxiety     Herpes genitalia        CC No referring provider defined for this encounter. on close of this encounter.      Again, thank you for allowing me to participate in the care of your patient.      Sincerely,    Luis Palomares MD

## 2021-09-28 NOTE — PROGRESS NOTES
[facility] Dermatology Note  Encounter Date: Sep 28, 2021  Office Visit     Dermatology Problem List:  1. Morphea  - biopsy on the back at derm surg c/w morphea (per patient) 2/2021  - current: plan to start UVA and mycophenolate 1000 BID, cont  clobetasol and calcipotriene.   - prior: methotrexate 20 mg q week (morphea was stable but not improving so transitioned to cellcept today), fillers (would like to continue)    ____________________________________________    Assessment & Plan:     # Morphea  No evidence of skin tightening on the tips of the finger or other features of systemic sclerosis. Agree with prior diagnosis of morphea. If any question about the diagnosis appear, can consider reviewing derm specialist biopsy slides. Was treated with methotrexate 20 mg q week, clobetasol, calcipotriene at derm specialists and morphea remained stable but did not improve. Discussed etiology, natural history, and treatment options of morphea including cell cept and UVA. Discussed risks/benefits of UVA including theoretical increase in skin cancer risk over lifetime. Did not discuss, but could consider filler to help with lip droop. Of note, patient would like to become pregnant in the future, but not in the near future. She will let us know well in advance if she wishes to become pregnant again.   - plan to start UVA1 phototherapy  - plan to start cellcept with gradual increase to goal of 1000 mg BID   - discontinue methotrexate  - f/u CBC w diff, CMP  - cont clobetasol and calcipotriene  - PRN cosmetic derm and oral maxillofacial for lip droop in the future    Procedures Performed:   none    Follow-up: 2 month(s) in-person, or earlier for new or changing lesions    Staff and Resident:     Dr Palomares - staff    Destini Hernandez MD PGY-2  Medicine-Dermatology    I have seen and examined this patient and agree with the assessment and plan as documented in the resident's note.    Luis Palomares MD  Dermatology  "Attending    ____________________________________________    CC: Derm Problem (Cierra is here today for Morphea. She states\" I thought I had birth marks on my back and my jawline is drooping and I am here for a second opinion.\")    HPI:  Ms. Cierra Suazo is a(n) 25 year old female who presents today as a new patient for second opinion on morphea treatment.   Referred from [facility]. First noticed skin changes on her jaw with a droop around late teens/early 20s. Also has back / arms involvement that she thought were birthmarks. Reports increased fatigue but has a 2yo baby. Morphea was maybe a little darker when she was pregant.  Had a punch biopsy on the back with [facility] in 2/2021. They treated her with methotrexate, clobetasol and calcipotriene. Not sure if anything has improved. Skin is stable but not improving she thinks. Denies genital involvement. She is wondering about meds and issues with fertility as she would like more children in the future. Not planning for pregnancy in the near future. Patient is otherwise feeling well, without additional skin concerns. No autoimmune concerns in the family.    Labs Reviewed:  N/A    Physical Exam:  Vitals: There were no vitals taken for this visit.  SKIN: Total skin excluding the undergarment areas was performed. The exam included the head/face, neck, both arms, chest, back, abdomen, both legs, digits and/or nails.   - hyperpigmented firm plaques on the back, abdomen, arms  - firm skin colored plaque on the lower L cutaneous lip causing mild depression  - No other lesions of concern on areas examined.     Medications:  Current Outpatient Medications   Medication     acetaminophen (TYLENOL) 325 MG tablet     escitalopram (LEXAPRO) 5 MG tablet     ibuprofen (ADVIL/MOTRIN) 800 MG tablet     Prenatal Vit-Fe Fumarate-FA (PNV PRENATAL PLUS MULTIVITAMIN) 27-1 MG TABS per tablet     valACYclovir (VALTREX) 500 MG tablet     No current facility-administered " medications for this visit.      Past Medical History:   Patient Active Problem List   Diagnosis     Labor and delivery, indication for care      (normal spontaneous vaginal delivery)     Past Medical History:   Diagnosis Date     Depressive disorder     anxiety     Herpes genitalia        CC No referring provider defined for this encounter. on close of this encounter.

## 2021-09-28 NOTE — LETTER
Date:November 19, 2021      Patient was self referred, no letter generated. Do not send.        [facility] Health Information

## 2021-09-28 NOTE — NURSING NOTE
"Dermatology Rooming Note    Cierra Suazo's goals for this visit include:   Chief Complaint   Patient presents with     Derm Problem     Cierra is here today for Morphea. She states\" I thought I had birth marks on my back and my jawline is drooping and I am here for a second opinion.\"     Alison Nicole, IZZY  "

## 2021-09-29 ENCOUNTER — TELEPHONE (OUTPATIENT)
Dept: DERMATOLOGY | Facility: CLINIC | Age: 26
End: 2021-09-29

## 2021-10-01 ENCOUNTER — LAB (OUTPATIENT)
Dept: LAB | Facility: CLINIC | Age: 26
End: 2021-10-01
Payer: COMMERCIAL

## 2021-10-01 DIAGNOSIS — L94.0 MORPHEA: ICD-10-CM

## 2021-10-01 LAB
BASOPHILS # BLD AUTO: 0.1 10E3/UL (ref 0–0.2)
BASOPHILS NFR BLD AUTO: 1 %
EOSINOPHIL # BLD AUTO: 0.2 10E3/UL (ref 0–0.7)
EOSINOPHIL NFR BLD AUTO: 3 %
ERYTHROCYTE [DISTWIDTH] IN BLOOD BY AUTOMATED COUNT: 14.3 % (ref 10–15)
HCT VFR BLD AUTO: 38.2 % (ref 35–47)
HGB BLD-MCNC: 12.6 G/DL (ref 11.7–15.7)
LYMPHOCYTES # BLD AUTO: 2.3 10E3/UL (ref 0.8–5.3)
LYMPHOCYTES NFR BLD AUTO: 32 %
MCH RBC QN AUTO: 29.3 PG (ref 26.5–33)
MCHC RBC AUTO-ENTMCNC: 33 G/DL (ref 31.5–36.5)
MCV RBC AUTO: 89 FL (ref 78–100)
MONOCYTES # BLD AUTO: 0.7 10E3/UL (ref 0–1.3)
MONOCYTES NFR BLD AUTO: 10 %
NEUTROPHILS # BLD AUTO: 4 10E3/UL (ref 1.6–8.3)
NEUTROPHILS NFR BLD AUTO: 55 %
PLATELET # BLD AUTO: 323 10E3/UL (ref 150–450)
RBC # BLD AUTO: 4.3 10E6/UL (ref 3.8–5.2)
WBC # BLD AUTO: 7.3 10E3/UL (ref 4–11)

## 2021-10-01 PROCEDURE — 36415 COLL VENOUS BLD VENIPUNCTURE: CPT

## 2021-10-01 PROCEDURE — 85025 COMPLETE CBC W/AUTO DIFF WBC: CPT

## 2021-10-01 PROCEDURE — 80053 COMPREHEN METABOLIC PANEL: CPT

## 2021-10-01 NOTE — TELEPHONE ENCOUNTER
"Nila Sommer; [facility] Dermatology Adult Csc 23 hours ago (2:26 PM)     DL    Hello,   \"NO\" PA is required for the UVA-1 treatments.   Thanks,   Nila        "

## 2021-10-02 LAB
ALBUMIN SERPL-MCNC: 4 G/DL (ref 3.4–5)
ALP SERPL-CCNC: 51 U/L (ref 40–150)
ALT SERPL W P-5'-P-CCNC: 20 U/L (ref 0–50)
ANION GAP SERPL CALCULATED.3IONS-SCNC: 5 MMOL/L (ref 3–14)
AST SERPL W P-5'-P-CCNC: 21 U/L (ref 0–45)
BILIRUB SERPL-MCNC: 0.8 MG/DL (ref 0.2–1.3)
BUN SERPL-MCNC: 8 MG/DL (ref 7–30)
CALCIUM SERPL-MCNC: 9.1 MG/DL (ref 8.5–10.1)
CHLORIDE BLD-SCNC: 110 MMOL/L (ref 94–109)
CO2 SERPL-SCNC: 24 MMOL/L (ref 20–32)
CREAT SERPL-MCNC: 0.64 MG/DL (ref 0.52–1.04)
GFR SERPL CREATININE-BSD FRML MDRD: >90 ML/MIN/1.73M2
GLUCOSE BLD-MCNC: 96 MG/DL (ref 70–99)
POTASSIUM BLD-SCNC: 3.9 MMOL/L (ref 3.4–5.3)
PROT SERPL-MCNC: 7.3 G/DL (ref 6.8–8.8)
SODIUM SERPL-SCNC: 139 MMOL/L (ref 133–144)

## 2021-10-06 ENCOUNTER — OFFICE VISIT (OUTPATIENT)
Dept: DERMATOLOGY | Facility: CLINIC | Age: 26
End: 2021-10-06
Payer: COMMERCIAL

## 2021-10-06 DIAGNOSIS — L30.9 DERMATITIS: ICD-10-CM

## 2021-10-06 PROCEDURE — 96900 ACTINOTHERAPY UV LIGHT: CPT | Performed by: DERMATOLOGY

## 2021-10-06 PROCEDURE — 99207 PR NO CHARGE NURSE ONLY: CPT | Performed by: DERMATOLOGY

## 2021-10-06 NOTE — PROGRESS NOTES
[facility] Dermatology Phototherapy Record  1. Cierra Suazo is a 25 year old female is here today for phototherapy (UVA) treatment for Dermatitis [L30.9.        Changes or new medications since last treatment:   NO    New medical conditions or problems since last treatment:   NO    Any problems with last phototherapy treatment?    NO    2. The patient tolerated phototherapy without complication    Patient will return  for next UVA treatment, per protocol.     Patient to see provider every 4-12 weeks for follow-up during treatment.      All questions and concerns discussed with patient in clinic today.      Anushka Mueller RN

## 2021-10-12 ENCOUNTER — OFFICE VISIT (OUTPATIENT)
Dept: DERMATOLOGY | Facility: CLINIC | Age: 26
End: 2021-10-12
Payer: COMMERCIAL

## 2021-10-12 DIAGNOSIS — L30.9 DERMATITIS: ICD-10-CM

## 2021-10-12 PROCEDURE — 99207 PR NO CHARGE LOS: CPT | Performed by: DERMATOLOGY

## 2021-10-12 PROCEDURE — 96900 ACTINOTHERAPY UV LIGHT: CPT | Performed by: DERMATOLOGY

## 2021-10-12 NOTE — PROGRESS NOTES
[facility] Dermatology Phototherapy Record  1. Cierra Suazo is a 25 year old female is here today for phototherapy (UVB) treatment for Dermatitis .        Changes or new medications since last treatment:   NO    New medical conditions or problems since last treatment:   NO    Any problems with last phototherapy treatment?    NO    2. The patient tolerated phototherapy without complication    Patient will return for next UVB treatment, per protocol.     Patient to see provider every 4-12 weeks for follow-up during treatment.      All questions and concerns discussed with patient in clinic today.        Cierra Suazo comes into clinic today at the request of Dr Palomares Ordering Provider for UVA-1.    This service provided today was under the supervising provider of the day Dr Liao, who was available if needed.    Leanne Luis RN

## 2021-10-13 NOTE — NURSING NOTE
Cierra Suazo comes into clinic today at the request of Dr Palomares Ordering Provider for .        This service provided today was under the supervising provider of the day dr Liao who was available if needed.    Anushka Mueller RN

## 2021-10-14 ENCOUNTER — OFFICE VISIT (OUTPATIENT)
Dept: DERMATOLOGY | Facility: CLINIC | Age: 26
End: 2021-10-14
Payer: COMMERCIAL

## 2021-10-14 DIAGNOSIS — L30.9 DERMATITIS: ICD-10-CM

## 2021-10-14 PROCEDURE — 99207 PR NO CHARGE LOS: CPT | Performed by: DERMATOLOGY

## 2021-10-14 PROCEDURE — 96900 ACTINOTHERAPY UV LIGHT: CPT | Performed by: DERMATOLOGY

## 2021-10-14 NOTE — PROGRESS NOTES
[facility] Dermatology Phototherapy Record  1. Cierra Suazo is a 25 year old female is here today for phototherapy (UVA) treatment for dermatitis.        Changes or new medications since last treatment:   NO    New medical conditions or problems since last treatment:   NO    Any problems with last phototherapy treatment?    NO    2. The patient tolerated phototherapy without complication    Patient will return for next UVA treatment, per protocol.     Patient to see provider every 4-12 weeks for follow-up during treatment.      All questions and concerns discussed with patient in clinic today.      Avis Ramesh RN    Cierra Suazo comes into clinic today at the request of  Ordering Provider for UVA.    This service provided today was under the supervising provider of the day , who was available if needed.    Avis Ramesh RN

## 2021-10-23 PROBLEM — L94.0 MORPHEA: Status: ACTIVE | Noted: 2021-10-23

## 2021-10-23 PROBLEM — L30.9 DERMATITIS: Status: ACTIVE | Noted: 2021-10-23

## 2021-10-31 ENCOUNTER — HEALTH MAINTENANCE LETTER (OUTPATIENT)
Age: 26
End: 2021-10-31

## 2021-11-02 ENCOUNTER — OFFICE VISIT (OUTPATIENT)
Dept: DERMATOLOGY | Facility: CLINIC | Age: 26
End: 2021-11-02
Payer: COMMERCIAL

## 2021-11-02 DIAGNOSIS — L30.9 DERMATITIS: ICD-10-CM

## 2021-11-02 PROCEDURE — 99207 PR NO CHARGE LOS: CPT | Performed by: DERMATOLOGY

## 2021-11-02 PROCEDURE — 96900 ACTINOTHERAPY UV LIGHT: CPT | Performed by: DERMATOLOGY

## 2021-11-02 RX ORDER — FLUCONAZOLE 150 MG/1
TABLET ORAL
COMMUNITY

## 2021-11-02 RX ORDER — METRONIDAZOLE 500 MG/1
TABLET ORAL
COMMUNITY
Start: 2021-10-29

## 2021-11-02 NOTE — PROGRESS NOTES
[facility] Dermatology Phototherapy Record  1. Cierra Suazo is a 26 year old female is here today for phototherapy (UVA-1) treatment for Dermatitis.        Changes or new medications since last treatment:   NO    New medical conditions or problems since last treatment:   NO    Any problems with last phototherapy treatment?    NO    2. The patient tolerated phototherapy without complication    Patient will return for next UVA treatment, per protocol.     Patient to see provider every 4-12 weeks for follow-up during treatment.      All questions and concerns discussed with patient in clinic today.      Cierra Suazo comes into clinic today at the request of Dr Palomares  Ordering Provider for UVA-1.    This service provided today was under the supervising provider of the day Dr Liao, who was available if needed.    Leanne Luis RN

## 2021-11-18 ENCOUNTER — OFFICE VISIT (OUTPATIENT)
Dept: DERMATOLOGY | Facility: CLINIC | Age: 26
End: 2021-11-18
Payer: COMMERCIAL

## 2021-11-18 DIAGNOSIS — L94.0 MORPHEA: Primary | ICD-10-CM

## 2021-11-18 DIAGNOSIS — Z79.899 ENCOUNTER FOR LONG-TERM CURRENT USE OF HIGH RISK MEDICATION: ICD-10-CM

## 2021-11-18 PROCEDURE — 99214 OFFICE O/P EST MOD 30 MIN: CPT | Performed by: DERMATOLOGY

## 2021-11-18 RX ORDER — CALCIPOTRIENE 50 UG/G
CREAM TOPICAL
Qty: 120 G | Refills: 1 | Status: SHIPPED | OUTPATIENT
Start: 2021-11-18 | End: 2022-04-28

## 2021-11-18 RX ORDER — MYCOPHENOLATE MOFETIL 500 MG/1
1000 TABLET ORAL 2 TIMES DAILY
Qty: 90 TABLET | Refills: 3 | Status: SHIPPED | OUTPATIENT
Start: 2021-11-18 | End: 2022-03-01

## 2021-11-18 ASSESSMENT — PAIN SCALES - GENERAL: PAINLEVEL: NO PAIN (0)

## 2021-11-18 NOTE — PROGRESS NOTES
[facility] Dermatology Note  Encounter Date: Nov 18, 2021  Office Visit     Dermatology Problem List:  1. Morphea  - biopsy on the back at derm surg c/w morphea (per patient) 2/2021  - current: continue start UVA and mycophenolate 1000 BID, cont clobetasol and calcipotriene.   - prior: methotrexate 20 mg q week (morphea was stable but not improving so transitioned to cellcept 9/28/21), fillers (would like to continue)     ____________________________________________    Assessment & Plan:    # Morphea  There is no evidence of worsening of skin tightening on the lips, fingers, back or torso. Increased hyperpigmentation noted on the back and torso.   Discussed etiology, natural history, and treatment options of morphea including cellcept and UVA. Discussed risks/benefits of UVA including theoretical increase in skin cancer risk over lifetime.  - Discussed options for cosmetic fillers to help with lip asymmetry when disease is not active.  - Continue UVA1 phototherapy  - Continue cellcept 1000 mg BID  - repeat CBC w/ diff, CMP   - Refilled clobetasol and calcipotriene; continue using bid    Follow-up: 3 month(s) in-person, or earlier for new or changing lesions     Staff and Medical Student:      Nader Bartholomew MS-IV   Staffed with Luis Fritz MD    I was present with the medical student who participated in the service and in the documentation.  I have verified the history and personally performed the physical exam and medical decision making.  I agree with the assessment and plan of care as documented in the note.    Luis Palomares MD  Dermatology Attending    ____________________________________________    CC: Derm Problem (Cierra is here today fora morphea follow up )    HPI:  Ms. Cierra Suazo is a(n) 26 year old female who presents today for follow-up  for morphea. Patient was last seen in clinic 9/28/21.    Patient was previously seen a dermatologist [facility] and was diagnosed with \"a biopsy proven morphea\". Patient is unclear if she is improving with new treatments. Patient states she still has pinkness in her hands.She notes that she was able to go to 4 light UVA therapy treatments, but states that she received a bill that showed the treatment was not being covered. She currently takes mycophenolate 2 tablets in the morning 2 tablets a night for a total of 2000 mg and tolerating the medication well since her last visit.She is using clobetasol and calcipotriene 2-3x a week. She would like a refill today. Patient notes that she does not believe her symptoms on the lip, back and torso are worsening. She will get photos taken from her previous Dermatologist scanned over for comparison.     Patient is otherwise feeling well, without additional skin concerns.    Labs:  CBC  reviewed.    Physical Exam:  Vitals: There were no vitals taken for this visit.  SKIN: Focused examination of face, chest, and back was performed.  - There is a indented stiff plaque on the left lower cutaneous lip significant with a \"groove-sign\"  - There is a firm skin-colored plaque that is slightly hyperpigmented in a widened geographic distribution located on the mid back crossing midline spanning towards the b/l lateral flank and anterior torso.   - No other lesions of concern on areas examined.     Medications:  Current Outpatient Medications   Medication     calcipotriene (DOVONOX) 0.005 % external cream     clobetasol (TEMOVATE) 0.05 % external cream     fluconazole (DIFLUCAN) 150 MG tablet     folic acid (FOLVITE) 1 MG tablet     methotrexate 2.5 MG tablet     metroNIDAZOLE (FLAGYL) 500 MG tablet     mycophenolate (GENERIC EQUIVALENT) 500 MG tablet     acetaminophen (TYLENOL) 325 MG tablet     escitalopram (LEXAPRO) 5 MG tablet     ibuprofen (ADVIL/MOTRIN) 800 MG tablet     Prenatal Vit-Fe Fumarate-FA (PNV PRENATAL PLUS MULTIVITAMIN) 27-1 MG TABS per tablet     valACYclovir " (VALTREX) 500 MG tablet     No current facility-administered medications for this visit.      Past Medical History:   Patient Active Problem List   Diagnosis     Labor and delivery, indication for care      (normal spontaneous vaginal delivery)     Morphea     Dermatitis     Past Medical History:   Diagnosis Date     Depressive disorder     anxiety     Herpes genitalia         CC No referring provider defined for this encounter. on close of this encounter.

## 2021-11-18 NOTE — LETTER
Date:December 13, 2021      Patient was self referred, no letter generated. Do not send.        [facility] Health Information

## 2021-11-18 NOTE — NURSING NOTE
Dermatology Rooming Note    Cierra Suazo's goals for this visit include:   Chief Complaint   Patient presents with     Derm Problem     Cierra is here today fora morphea follow up      IZZY Fay

## 2021-11-18 NOTE — LETTER
11/18/2021       RE: Cierra Suazo  [address]  [place] [address]     Dear Colleague,    Thank you for referring your patient, Cierra Suazo, to the [facility] DERMATOLOGY CL[facility]IC [place] [facility]. Please see a copy of my visit note below.    [facility] Dermatology Note  Encounter Date: Nov 18, 2021  Office Visit     Dermatology Problem List:  1. Morphea  - biopsy on the back at derm surg c/w morphea (per patient) 2/2021  - current: continue start UVA and mycophenolate 1000 BID, cont clobetasol and calcipotriene.   - prior: methotrexate 20 mg q week (morphea was stable but not improving so transitioned to cellcept 9/28/21), fillers (would like to continue)     ____________________________________________    Assessment & Plan:    # Morphea  There is no evidence of worsening of skin tightening on the lips, fingers, back or torso. Increased hyperpigmentation noted on the back and torso.   Discussed etiology, natural history, and treatment options of morphea including cellcept and UVA. Discussed risks/benefits of UVA including theoretical increase in skin cancer risk over lifetime.  - Discussed options for cosmetic fillers to help with lip asymmetry when disease is not active.  - Continue UVA1 phototherapy  - Continue cellcept 1000 mg BID  - repeat CBC w/ diff, CMP   - Refilled clobetasol and calcipotriene; continue using bid    Follow-up: 3 month(s) in-person, or earlier for new or changing lesions     Staff and Medical Student:      Nader Bartholomew MS-IV   Staffed with Luis Fritz MD    I was present with the medical student who participated in the service and in the documentation.  I have verified the history and personally performed the physical exam and medical decision making.  I agree with the assessment and plan of care as documented in the note.    Luis Palomares MD  Dermatology  "Attending    ____________________________________________    CC: Derm Problem (Cierra is here today fora morphea follow up )    HPI:  Ms. Cierra Suazo is a(n) 26 year old female who presents today for follow-up  for morphea. Patient was last seen in clinic 9/28/21.    Patient was previously seen a dermatologist ([facility]) and was diagnosed with \"a biopsy proven morphea\". Patient is unclear if she is improving with new treatments. Patient states she still has pinkness in her hands.She notes that she was able to go to 4 light UVA therapy treatments, but states that she received a bill that showed the treatment was not being covered. She currently takes mycophenolate 2 tablets in the morning 2 tablets a night for a total of 2000 mg and tolerating the medication well since her last visit.She is using clobetasol and calcipotriene 2-3x a week. She would like a refill today. Patient notes that she does not believe her symptoms on the lip, back and torso are worsening. She will get photos taken from her previous Dermatologist scanned over for comparison.     Patient is otherwise feeling well, without additional skin concerns.    Labs:  CBC  reviewed.    Physical Exam:  Vitals: There were no vitals taken for this visit.  SKIN: Focused examination of face, chest, and back was performed.  - There is a indented stiff plaque on the left lower cutaneous lip significant with a \"groove-sign\"  - There is a firm skin-colored plaque that is slightly hyperpigmented in a widened geographic distribution located on the mid back crossing midline spanning towards the b/l lateral flank and anterior torso.   - No other lesions of concern on areas examined.     Medications:  Current Outpatient Medications   Medication     calcipotriene (DOVONOX) 0.005 % external cream     clobetasol (TEMOVATE) 0.05 % external cream     fluconazole (DIFLUCAN) 150 MG tablet     folic acid (FOLVITE) 1 MG tablet     methotrexate 2.5 " MG tablet     metroNIDAZOLE (FLAGYL) 500 MG tablet     mycophenolate (GENERIC EQUIVALENT) 500 MG tablet     acetaminophen (TYLENOL) 325 MG tablet     escitalopram (LEXAPRO) 5 MG tablet     ibuprofen (ADVIL/MOTRIN) 800 MG tablet     Prenatal Vit-Fe Fumarate-FA (PNV PRENATAL PLUS MULTIVITAMIN) 27-1 MG TABS per tablet     valACYclovir (VALTREX) 500 MG tablet     No current facility-administered medications for this visit.      Past Medical History:   Patient Active Problem List   Diagnosis     Labor and delivery, indication for care      (normal spontaneous vaginal delivery)     Morphea     Dermatitis     Past Medical History:   Diagnosis Date     Depressive disorder     anxiety     Herpes genitalia         CC No referring provider defined for this encounter. on close of this encounter.        Again, thank you for allowing me to participate in the care of your patient.      Sincerely,    Luis Palomares MD

## 2021-12-12 PROBLEM — Z79.899 ENCOUNTER FOR LONG-TERM CURRENT USE OF HIGH RISK MEDICATION: Status: ACTIVE | Noted: 2021-12-12

## 2021-12-13 ENCOUNTER — TRANSFERRED RECORDS (OUTPATIENT)
Dept: HEALTH INFORMATION MANAGEMENT | Facility: CLINIC | Age: 26
End: 2021-12-13
Payer: COMMERCIAL

## 2021-12-14 ENCOUNTER — OFFICE VISIT (OUTPATIENT)
Dept: DERMATOLOGY | Facility: CLINIC | Age: 26
End: 2021-12-14
Payer: COMMERCIAL

## 2021-12-14 DIAGNOSIS — L30.9 DERMATITIS: ICD-10-CM

## 2021-12-14 PROCEDURE — 99207 PR NO CHARGE NURSE ONLY: CPT | Performed by: DERMATOLOGY

## 2021-12-14 PROCEDURE — 96900 ACTINOTHERAPY UV LIGHT: CPT | Performed by: DERMATOLOGY

## 2021-12-14 NOTE — PROGRESS NOTES
[facility] Dermatology Phototherapy Record  1. Cierra Suazo is a 26 year old female is here today for phototherapy (UVA1) treatment for Dermatitis.        Changes or new medications since last treatment:   NO    New medical conditions or problems since last treatment:   NO    Any problems with last phototherapy treatment?    NO    2. The patient tolerated phototherapy without complication    Patient will return for next UVA1 treatment, per protocol.     Patient to see provider every 4-12 weeks for follow-up during treatment.      All questions and concerns discussed with patient in clinic today.      Jamar Garrido

## 2021-12-14 NOTE — NURSING NOTE
Cierra Suazo comes into clinic today at the request of Dr. Palomares Ordering Provider for Dermatitis.      This service provided today was under the supervising provider of the day Dr. Liao, who was available if needed.    Jamar Garrido

## 2021-12-16 ENCOUNTER — OFFICE VISIT (OUTPATIENT)
Dept: DERMATOLOGY | Facility: CLINIC | Age: 26
End: 2021-12-16
Payer: COMMERCIAL

## 2021-12-16 DIAGNOSIS — L30.9 DERMATITIS: ICD-10-CM

## 2021-12-16 PROCEDURE — 96900 ACTINOTHERAPY UV LIGHT: CPT | Performed by: DERMATOLOGY

## 2021-12-16 PROCEDURE — 99207 PR NO CHARGE NURSE ONLY: CPT | Performed by: DERMATOLOGY

## 2021-12-16 NOTE — PROGRESS NOTES
[facility] Dermatology Phototherapy Record  1. Cierra Suazo is a 26 year old female is here today for phototherapy (UVA) treatment for Dermatitis [L30.9] .        Changes or new medications since last treatment:   NO    New medical conditions or problems since last treatment:   NO    Any problems with last phototherapy treatment?    NO    2. The patient tolerated phototherapy without complication    Patient will return  for next UVA treatment, per protocol.     Patient to see provider every 4-12 weeks for follow-up during treatment.      All questions and concerns discussed with patient in clinic today.      Anushka Mueller RN

## 2021-12-20 ENCOUNTER — LAB (OUTPATIENT)
Dept: LAB | Facility: CLINIC | Age: 26
End: 2021-12-20
Payer: COMMERCIAL

## 2021-12-20 DIAGNOSIS — L94.0 MORPHEA: ICD-10-CM

## 2021-12-20 LAB
BASOPHILS # BLD AUTO: 0.1 10E3/UL (ref 0–0.2)
BASOPHILS NFR BLD AUTO: 1 %
EOSINOPHIL # BLD AUTO: 0.1 10E3/UL (ref 0–0.7)
EOSINOPHIL NFR BLD AUTO: 1 %
ERYTHROCYTE [DISTWIDTH] IN BLOOD BY AUTOMATED COUNT: 13.2 % (ref 10–15)
HCT VFR BLD AUTO: 41.2 % (ref 35–47)
HGB BLD-MCNC: 13.7 G/DL (ref 11.7–15.7)
LYMPHOCYTES # BLD AUTO: 1.7 10E3/UL (ref 0.8–5.3)
LYMPHOCYTES NFR BLD AUTO: 15 %
MCH RBC QN AUTO: 28.9 PG (ref 26.5–33)
MCHC RBC AUTO-ENTMCNC: 33.3 G/DL (ref 31.5–36.5)
MCV RBC AUTO: 87 FL (ref 78–100)
MONOCYTES # BLD AUTO: 1 10E3/UL (ref 0–1.3)
MONOCYTES NFR BLD AUTO: 9 %
NEUTROPHILS # BLD AUTO: 8.1 10E3/UL (ref 1.6–8.3)
NEUTROPHILS NFR BLD AUTO: 74 %
PLATELET # BLD AUTO: 291 10E3/UL (ref 150–450)
RBC # BLD AUTO: 4.74 10E6/UL (ref 3.8–5.2)
WBC # BLD AUTO: 10.9 10E3/UL (ref 4–11)

## 2021-12-20 PROCEDURE — 85025 COMPLETE CBC W/AUTO DIFF WBC: CPT

## 2021-12-20 PROCEDURE — 36415 COLL VENOUS BLD VENIPUNCTURE: CPT

## 2021-12-20 PROCEDURE — 80053 COMPREHEN METABOLIC PANEL: CPT

## 2021-12-21 LAB
ALBUMIN SERPL-MCNC: 4.3 G/DL (ref 3.4–5)
ALP SERPL-CCNC: 59 U/L (ref 40–150)
ALT SERPL W P-5'-P-CCNC: 18 U/L (ref 0–50)
ANION GAP SERPL CALCULATED.3IONS-SCNC: 5 MMOL/L (ref 3–14)
AST SERPL W P-5'-P-CCNC: 18 U/L (ref 0–45)
BILIRUB SERPL-MCNC: 1 MG/DL (ref 0.2–1.3)
BUN SERPL-MCNC: 13 MG/DL (ref 7–30)
CALCIUM SERPL-MCNC: 9.2 MG/DL (ref 8.5–10.1)
CHLORIDE BLD-SCNC: 107 MMOL/L (ref 94–109)
CO2 SERPL-SCNC: 27 MMOL/L (ref 20–32)
CREAT SERPL-MCNC: 0.63 MG/DL (ref 0.52–1.04)
GFR SERPL CREATININE-BSD FRML MDRD: >90 ML/MIN/1.73M2
GLUCOSE BLD-MCNC: 70 MG/DL (ref 70–99)
POTASSIUM BLD-SCNC: 3.8 MMOL/L (ref 3.4–5.3)
PROT SERPL-MCNC: 7.6 G/DL (ref 6.8–8.8)
SODIUM SERPL-SCNC: 139 MMOL/L (ref 133–144)

## 2022-01-06 ENCOUNTER — TELEPHONE (OUTPATIENT)
Dept: DERMATOLOGY | Facility: CLINIC | Age: 27
End: 2022-01-06
Payer: COMMERCIAL

## 2022-01-06 NOTE — TELEPHONE ENCOUNTER
[facility] Call Center    Phone Message    May a detailed message be left on voicemail: yes     Reason for Call: Appointment Intake    Referring Provider Name:   Pt of Dr Palomares    Diagnosis and/or Symptoms:   UVA1 per Dr. Palomares   2x/week    Action Taken: Message routed to:  [facility] Derm    Travel Screening: Not Applicable     Pt looking to schedule UVA1 appts, approx 10 appts if possible.    Please return call to Pt. Thanks!

## 2022-02-03 ENCOUNTER — OFFICE VISIT (OUTPATIENT)
Dept: DERMATOLOGY | Facility: CLINIC | Age: 27
End: 2022-02-03
Payer: COMMERCIAL

## 2022-02-03 DIAGNOSIS — L30.9 DERMATITIS: ICD-10-CM

## 2022-02-03 PROCEDURE — 99207 PR NO CHARGE NURSE ONLY: CPT | Performed by: DERMATOLOGY

## 2022-02-03 PROCEDURE — 96900 ACTINOTHERAPY UV LIGHT: CPT | Performed by: DERMATOLOGY

## 2022-02-03 NOTE — PROGRESS NOTES
[facility] Dermatology Phototherapy Record  1. Cierra Suazo is a 26 year old female is here today for phototherapy (UVA-1) treatment for Dermatitis.        Changes or new medications since last treatment:   NO    New medical conditions or problems since last treatment:   NO    Any problems with last phototherapy treatment?    NO    2. The patient tolerated phototherapy without complication    Patient will return for next UVA treatment, per protocol.     Patient to see provider every 4-12 weeks for follow-up during treatment.      All questions and concerns discussed with patient in clinic today.      Cierra Suazo comes into clinic today at the request of Dr Palomares Ordering Provider for UVA-1.    This service provided today was under the supervising provider of the day Dr Liao, who was available if needed.    Leanne Luis RN

## 2022-02-08 ENCOUNTER — OFFICE VISIT (OUTPATIENT)
Dept: DERMATOLOGY | Facility: CLINIC | Age: 27
End: 2022-02-08
Payer: COMMERCIAL

## 2022-02-08 DIAGNOSIS — L30.9 DERMATITIS: ICD-10-CM

## 2022-02-08 PROCEDURE — 99207 PR NO CHARGE NURSE ONLY: CPT | Performed by: DERMATOLOGY

## 2022-02-08 PROCEDURE — 96900 ACTINOTHERAPY UV LIGHT: CPT | Performed by: DERMATOLOGY

## 2022-02-08 NOTE — PROGRESS NOTES
[facility] Dermatology Phototherapy Record  1. Cierra Suazo is a 26 year old female is here today for phototherapy (UVA) treatment for Dermatitis [L30.9] .        Changes or new medications since last treatment:   NO    New medical conditions or problems since last treatment:   NO    Any problems with last phototherapy treatment?    NO    2. The patient tolerated phototherapy without complication    Patient will return  for next UVA treatment, per protocol.     Patient to see provider every 4-12 weeks for follow-up during treatment.      All questions and concerns discussed with patient in clinic today.      Anushka Mueller RN

## 2022-02-08 NOTE — NURSING NOTE
Cierra Suazo comes into clinic today at the request of Dr Palomares Ordering Provider for .        This service provided today was under the supervising provider of the day DR Liao, who was available if needed.    Anushka Mueller RN

## 2022-02-11 ENCOUNTER — OFFICE VISIT (OUTPATIENT)
Dept: DERMATOLOGY | Facility: CLINIC | Age: 27
End: 2022-02-11
Payer: COMMERCIAL

## 2022-02-11 DIAGNOSIS — L30.9 DERMATITIS: ICD-10-CM

## 2022-02-11 PROCEDURE — 96900 ACTINOTHERAPY UV LIGHT: CPT | Performed by: DERMATOLOGY

## 2022-02-11 NOTE — NURSING NOTE
Cierra Suazo comes into clinic today at the request of Dr. Palomares Ordering Provider for [facility].    This service provided today was under the supervising provider of the day Dr. Monson, who was available if needed.    Alon Vera, [facility]

## 2022-02-11 NOTE — PROGRESS NOTES
[facility] Dermatology Phototherapy Record  1. Cierra Suazo is a 26 year old female is here today for phototherapy (UVA) treatment for Dermatitis [L30.9].        Changes or new medications since last treatment:   NO    New medical conditions or problems since last treatment:   NO    Any problems with last phototherapy treatment?    NO    2. The patient tolerated phototherapy without complication    Patient will return for next UVA treatment, per protocol.     Patient to see provider every 4-12 weeks for follow-up during treatment.      All questions and concerns discussed with patient in clinic today.      Alon Vera, [facility]

## 2022-02-15 ENCOUNTER — OFFICE VISIT (OUTPATIENT)
Dept: DERMATOLOGY | Facility: CLINIC | Age: 27
End: 2022-02-15
Payer: COMMERCIAL

## 2022-02-15 DIAGNOSIS — L30.9 DERMATITIS: ICD-10-CM

## 2022-02-15 DIAGNOSIS — L94.0 MORPHEA: ICD-10-CM

## 2022-02-15 DIAGNOSIS — Z79.899 ENCOUNTER FOR LONG-TERM CURRENT USE OF HIGH RISK MEDICATION: Primary | ICD-10-CM

## 2022-02-15 PROCEDURE — 96900 ACTINOTHERAPY UV LIGHT: CPT | Performed by: DERMATOLOGY

## 2022-02-15 PROCEDURE — 99207 PR NO CHARGE NURSE ONLY: CPT | Performed by: DERMATOLOGY

## 2022-02-15 PROCEDURE — 99214 OFFICE O/P EST MOD 30 MIN: CPT | Mod: GC | Performed by: DERMATOLOGY

## 2022-02-15 ASSESSMENT — PAIN SCALES - GENERAL: PAINLEVEL: NO PAIN (0)

## 2022-02-15 NOTE — LETTER
2/15/2022       RE: Cierra Suazo  [address]  [facility] [id number]     Dear Colleague,    Thank you for referring your patient, Cierra Suazo, to the [facility] DERMATOLOGY CLINIC [place] at [facility]. Please see a copy of my visit note below.    [facility] Dermatology Note      Dermatology Problem List:  1. Morphea  - biopsy on the back at derm surg c/w morphea (per patient) 2/2021  - current: has had x10 txs of UVA1 and continues on mycophenolate 1000 BID. cont clobetasol and calcipotriene.   - prior: methotrexate 20 mg q week (morphea was stable but not improving so transitioned to cellcept 9/28/21), fillers ~8 years ago     ____________________________________________     Assessment & Plan:     # Morphea  No evidence of worsening of skin tightening on the lips, fingers, back, or torso. Slightly increased hyperpigmentation on the back and torso with light therapy, been to 10x sessions so far. Has continued cellcept.  Discussed prognosis and treatment options of morphea. Discussed risks/benefits of UVA including theoretical increase in skin cancer risk over lifetime.   - Discussed options for cosmetic fillers to help with lip asymmetry, in future when disease is not active  - Discussed potentially doing steroid injection for face deformity at next visit  - Continue UVA1 phototherapy, aim for at least 20 treatments before deciding on response  - Continue cellcept 1000 mg BID  - Ordered CBC w/ diff, CMP   - Continue clobetasol and calcipotriene    CC Referred Self  No address on file on close of this encounter.  Follow-up in 2 months, earlier for new or changing lesions.       Dr.Daniel Palomares staffed the patient.      Daniel Ravi MD  [facility] Resident    I have seen and examined this patient and agree with the assessment and plan as documented in the resident's note.    Luis Palomares MD  Dermatology  "Attending      Encounter Date: Feb 15, 2022    CC:   Chief Complaint   Patient presents with     Derm Problem     Cierra is here today for morphea. She states\" my skin is the same not worse.\"          History of Present Illness:  Ms. Cierra Suazo is a 26 year old female who presents as a follow-up for morphea. The patient was last seen 2021 by Dr Palomares.     Says condition has overall been stable and she hasn't really noticed any changed beyond some mild further hyperpigmentation of the lesions on the back and torso. She has not noticed any further skin tightening and actually feels like maybe the jaw skin is loosening up just a little. Has been to 10x UVA1 treatments thus far and has the remainder of the month also booked out. Is interested in doing filler for the face disfiguration when the disease is considered as stable as possible; has had this ~7-9 years ago when all this first started. Continues on cellcept as prescribed.    Past Medical History:   Patient Active Problem List   Diagnosis     Labor and delivery, indication for care      (normal spontaneous vaginal delivery)     Morphea     Dermatitis     Encounter for long-term current use of high risk medication     Past Medical History:   Diagnosis Date     Depressive disorder     anxiety     Herpes genitalia      Past Surgical History:   Procedure Laterality Date     BREAST SURGERY  2019    augmentation       Social History:  Patient reports that she has never smoked. She has never used smokeless tobacco. She reports previous alcohol use. She reports previous drug use.    Family History:  No family history on file.    Medications:  Current Outpatient Medications   Medication Sig Dispense Refill     calcipotriene (DOVONOX) 0.005 % external cream Apply to areas of tightness and scarring twice daily as needed. 120 g 1     clobetasol (TEMOVATE) 0.05 % external cream 2 times daily       mycophenolate (GENERIC EQUIVALENT) 500 MG tablet Take 2 " tablets (1,000 mg) by mouth 2 times daily 90 tablet 3     acetaminophen (TYLENOL) 325 MG tablet Take 2 tablets (650 mg) by mouth every 4 hours as needed for mild pain or fever (greater than or equal to 38  C /100.4  F (oral) or 38.5  C/ 101.4  F (core).) (Patient not taking: Reported on 9/28/2021) 1 Bottle 0     escitalopram (LEXAPRO) 5 MG tablet  (Patient not taking: Reported on 9/28/2021)       fluconazole (DIFLUCAN) 150 MG tablet Diflucan 150 mg tablet   Take 1 tablet by oral route, repeat 2nd dose after 7 days. (Patient not taking: Reported on 2/15/2022)       folic acid (FOLVITE) 1 MG tablet Take 1,000 mcg by mouth daily (Patient not taking: Reported on 2/15/2022)       ibuprofen (ADVIL/MOTRIN) 800 MG tablet Take 1 tablet (800 mg) by mouth every 6 hours as needed for other (cramping) (Patient not taking: Reported on 9/28/2021) 30 tablet 0     methotrexate 2.5 MG tablet Take 8 tablets by mouth once a week (Patient not taking: Reported on 2/15/2022)       metroNIDAZOLE (FLAGYL) 500 MG tablet TAKE 1 TABLET BY MOUTH TWICE DAILY FOR 7 DAYS (Patient not taking: Reported on 2/15/2022)       Prenatal Vit-Fe Fumarate-FA (PNV PRENATAL PLUS MULTIVITAMIN) 27-1 MG TABS per tablet Take 1 tablet by mouth daily (Patient not taking: Reported on 9/28/2021)       valACYclovir (VALTREX) 500 MG tablet  (Patient not taking: Reported on 9/28/2021)          No Known Allergies      Review of Systems:  -As per HPI  -Constitutional: Otherwise feeling well today, in usual state of health.  -HEENT: skin of lower left jaw feeling slightly more loose than prior but no other remarkable changes.  -Skin: As above in HPI. No additional skin concerns.    Physical exam:  Vitals: There were no vitals taken for this visit.  GEN: This is a well developed, well-nourished female in no acute distress, in a pleasant mood.    SKIN: Focused examination of face, torso/trunk, and back was performed.  -There is a indented stiff plaque on the left lower  cutaneous lip with a groove-sign  -There is a firm skin-colored plaque that is mildly hyperpigmented with light pink tinge in a widened geographic distribution located on the mid back crossing midline spanning towards the b/l lateral flank and anterior right torso.   -No other lesions of concern on areas examined.         Again, thank you for allowing me to participate in the care of your patient.      Sincerely,    Luis Palomares MD

## 2022-02-15 NOTE — LETTER
Date:March 13, 2022      Provider requested that no letter be sent. Do not send.       [facility]

## 2022-02-15 NOTE — PROGRESS NOTES
[facility] Dermatology Phototherapy Record  1. Cierra Suazo is a 26 year old female is here today for phototherapy (UVB) treatment for Dermatitis.        Changes or new medications since last treatment:   NO    New medical conditions or problems since last treatment:   NO    Any problems with last phototherapy treatment?    NO    2. The patient tolerated phototherapy without complication    Patient will return for next UVB treatment, per protocol.     Patient to see provider every 4-12 weeks for follow-up during treatment.      All questions and concerns discussed with patient in clinic today.        Cierra Suazo comes into clinic today at the request of Dr Palomares Ordering Provider for UVA-1.    This service provided today was under the supervising provider of the day Dr Liao, who was available if needed.    Leanne Luis RN

## 2022-02-15 NOTE — PROGRESS NOTES
"[facility] Dermatology Note      Dermatology Problem List:  1. Morphea  - biopsy on the back at derm surg c/w morphea (per patient) 2/2021  - current: has had x10 txs of UVA1 and continues on mycophenolate 1000 BID. cont clobetasol and calcipotriene.   - prior: methotrexate 20 mg q week (morphea was stable but not improving so transitioned to cellcept 9/28/21), fillers ~8 years ago     ____________________________________________     Assessment & Plan:     # Morphea  No evidence of worsening of skin tightening on the lips, fingers, back, or torso. Slightly increased hyperpigmentation on the back and torso with light therapy, been to 10x sessions so far. Has continued cellcept.  Discussed prognosis and treatment options of morphea. Discussed risks/benefits of UVA including theoretical increase in skin cancer risk over lifetime.   - Discussed options for cosmetic fillers to help with lip asymmetry, in future when disease is not active  - Discussed potentially doing steroid injection for face deformity at next visit  - Continue UVA1 phototherapy, aim for at least 20 treatments before deciding on response  - Continue cellcept 1000 mg BID  - Ordered CBC w/ diff, CMP   - Continue clobetasol and calcipotriene    CC Referred Self  No address on file on close of this encounter.  Follow-up in 2 months, earlier for new or changing lesions.       Dr.Daniel Palomares staffed the patient.      Daniel Ravi MD  [facility] Medicine Resident    I have seen and examined this patient and agree with the assessment and plan as documented in the resident's note.    Luis Palomares MD  Dermatology Attending      Encounter Date: Feb 15, 2022    CC:   Chief Complaint   Patient presents with     Derm Problem     Cierra is here today for morphea. She states\" my skin is the same not worse.\"          History of Present Illness:  Ms. Cierra Suazo is a 26 year old female who presents as a follow-up for morphea. The " patient was last seen 2021 by Dr Palomares.     Says condition has overall been stable and she hasn't really noticed any changed beyond some mild further hyperpigmentation of the lesions on the back and torso. She has not noticed any further skin tightening and actually feels like maybe the jaw skin is loosening up just a little. Has been to 10x UVA1 treatments thus far and has the remainder of the month also booked out. Is interested in doing filler for the face disfiguration when the disease is considered as stable as possible; has had this ~7-9 years ago when all this first started. Continues on cellcept as prescribed.    Past Medical History:   Patient Active Problem List   Diagnosis     Labor and delivery, indication for care      (normal spontaneous vaginal delivery)     Morphea     Dermatitis     Encounter for long-term current use of high risk medication     Past Medical History:   Diagnosis Date     Depressive disorder     anxiety     Herpes genitalia      Past Surgical History:   Procedure Laterality Date     BREAST SURGERY  2019    augmentation       Social History:  Patient reports that she has never smoked. She has never used smokeless tobacco. She reports previous alcohol use. She reports previous drug use.    Family History:  No family history on file.    Medications:  Current Outpatient Medications   Medication Sig Dispense Refill     calcipotriene (DOVONOX) 0.005 % external cream Apply to areas of tightness and scarring twice daily as needed. 120 g 1     clobetasol (TEMOVATE) 0.05 % external cream 2 times daily       mycophenolate (GENERIC EQUIVALENT) 500 MG tablet Take 2 tablets (1,000 mg) by mouth 2 times daily 90 tablet 3     acetaminophen (TYLENOL) 325 MG tablet Take 2 tablets (650 mg) by mouth every 4 hours as needed for mild pain or fever (greater than or equal to 38  C /100.4  F (oral) or 38.5  C/ 101.4  F (core).) (Patient not taking: Reported on 2021) 1 Bottle 0      escitalopram (LEXAPRO) 5 MG tablet  (Patient not taking: Reported on 9/28/2021)       fluconazole (DIFLUCAN) 150 MG tablet Diflucan 150 mg tablet   Take 1 tablet by oral route, repeat 2nd dose after 7 days. (Patient not taking: Reported on 2/15/2022)       folic acid (FOLVITE) 1 MG tablet Take 1,000 mcg by mouth daily (Patient not taking: Reported on 2/15/2022)       ibuprofen (ADVIL/MOTRIN) 800 MG tablet Take 1 tablet (800 mg) by mouth every 6 hours as needed for other (cramping) (Patient not taking: Reported on 9/28/2021) 30 tablet 0     methotrexate 2.5 MG tablet Take 8 tablets by mouth once a week (Patient not taking: Reported on 2/15/2022)       metroNIDAZOLE (FLAGYL) 500 MG tablet TAKE 1 TABLET BY MOUTH TWICE DAILY FOR 7 DAYS (Patient not taking: Reported on 2/15/2022)       Prenatal Vit-Fe Fumarate-FA (PNV PRENATAL PLUS MULTIVITAMIN) 27-1 MG TABS per tablet Take 1 tablet by mouth daily (Patient not taking: Reported on 9/28/2021)       valACYclovir (VALTREX) 500 MG tablet  (Patient not taking: Reported on 9/28/2021)          No Known Allergies      Review of Systems:  -As per HPI  -Constitutional: Otherwise feeling well today, in usual state of health.  -HEENT: skin of lower left jaw feeling slightly more loose than prior but no other remarkable changes.  -Skin: As above in HPI. No additional skin concerns.    Physical exam:  Vitals: There were no vitals taken for this visit.  GEN: This is a well developed, well-nourished female in no acute distress, in a pleasant mood.    SKIN: Focused examination of face, torso/trunk, and back was performed.  -There is a indented stiff plaque on the left lower cutaneous lip with a groove-sign  -There is a firm skin-colored plaque that is mildly hyperpigmented with light pink tinge in a widened geographic distribution located on the mid back crossing midline spanning towards the b/l lateral flank and anterior right torso.   -No other lesions of concern on areas examined.

## 2022-02-15 NOTE — NURSING NOTE
"Dermatology Rooming Note    Cierra Suazo's goals for this visit include:   Chief Complaint   Patient presents with     Derm Problem     Cierra is here today for morphea. She states\" my skin is the same not worse.\"      Alison Nicole, RMWIL  "

## 2022-02-18 ENCOUNTER — OFFICE VISIT (OUTPATIENT)
Dept: DERMATOLOGY | Facility: CLINIC | Age: 27
End: 2022-02-18
Payer: COMMERCIAL

## 2022-02-18 DIAGNOSIS — L30.9 DERMATITIS: ICD-10-CM

## 2022-02-18 PROCEDURE — 99207 PR NO CHARGE NURSE ONLY: CPT | Performed by: DERMATOLOGY

## 2022-02-18 PROCEDURE — 96900 ACTINOTHERAPY UV LIGHT: CPT | Performed by: DERMATOLOGY

## 2022-02-18 NOTE — NURSING NOTE
Cierra Suazo comes into clinic today at the request of Dr. Palomares Ordering Provider.      This service provided today was under the supervising provider of the day Dr. Monson, who was available if needed.    Yamilet Garcia CMA

## 2022-02-18 NOTE — PROGRESS NOTES
[facility] Dermatology Phototherapy Record  1. Cierra Suazo is a 26 year old female is here today for phototherapy (UVA) treatment forDermatitis [L30.9]  .        Changes or new medications since last treatment:   NO    New medical conditions or problems since last treatment:   NO    Any problems with last phototherapy treatment?    NO    2. The patient tolerated phototherapy without complication    Patient will return for next UVA treatment, per protocol.     Patient to see provider every 4-12 weeks for follow-up during treatment.      All questions and concerns discussed with patient in clinic today.      Yamilet Garcia, CMA

## 2022-02-25 ENCOUNTER — OFFICE VISIT (OUTPATIENT)
Dept: DERMATOLOGY | Facility: CLINIC | Age: 27
End: 2022-02-25
Payer: COMMERCIAL

## 2022-02-25 DIAGNOSIS — L94.0 MORPHEA: ICD-10-CM

## 2022-02-25 DIAGNOSIS — L30.9 DERMATITIS: ICD-10-CM

## 2022-02-25 PROCEDURE — 96900 ACTINOTHERAPY UV LIGHT: CPT | Performed by: DERMATOLOGY

## 2022-02-25 NOTE — NURSING NOTE
Cierra Suazo comes into clinic today at the request of Dr. Palomares Ordering Provider for [facility].      This service provided today was under the supervising provider of the day Dr. Monson, who was available if needed.    Jaja Cantu, EMT

## 2022-02-25 NOTE — PROGRESS NOTES
[facility] Dermatology Phototherapy Record  1. Cierra Suazo is a 26 year old female is here today for phototherapy (UVA) treatment for Dermatitis [L30.9].        Changes or new medications since last treatment:   NO    New medical conditions or problems since last treatment:   NO    Any problems with last phototherapy treatment?    NO    2. The patient tolerated phototherapy without complication    Patient will return on for next UVA treatment, per protocol.     Patient to see provider every 4-12 weeks for follow-up during treatment.      All questions and concerns discussed with patient in clinic today.      Jaja Cantu, EMT

## 2022-03-01 NOTE — TELEPHONE ENCOUNTER
mycophenolate (GENERIC EQUIVALENT) 500 MG tablet      Last Written Prescription Date:  11/18/2021  Last Fill Quantity: 90,   # refills: 3  Last Office Visit : 2/15/2022  Future Office visit:  4/28/2022    Routing refill request to provider for review/approval because:  Medication not on the Dermatology refill protocol.  - plan last visit 2/15/2022 continue Cellcept 1000 mg BID

## 2022-03-02 RX ORDER — MYCOPHENOLATE MOFETIL 500 MG/1
1000 TABLET ORAL 2 TIMES DAILY
Qty: 120 TABLET | Refills: 3 | Status: SHIPPED | OUTPATIENT
Start: 2022-03-02 | End: 2023-01-12

## 2022-03-02 NOTE — TELEPHONE ENCOUNTER
Received refill request for mycophenolate as the resident on call.   Reviewed patient's chart and attached communication.   Patient last seen 2/15/222 for morphea. RTC 4/28/22.     After reviewing the medication list and assessment and plan from last visit, the refill request was approved.    CC'ing Dr. Palomares as FYI.    Daniel Richard MD  PGY-2 Dermatology

## 2022-03-14 ENCOUNTER — OFFICE VISIT (OUTPATIENT)
Dept: DERMATOLOGY | Facility: CLINIC | Age: 27
End: 2022-03-14
Payer: COMMERCIAL

## 2022-03-14 DIAGNOSIS — L30.9 DERMATITIS: ICD-10-CM

## 2022-03-14 PROCEDURE — 96900 ACTINOTHERAPY UV LIGHT: CPT | Performed by: DERMATOLOGY

## 2022-03-14 PROCEDURE — 99207 PR NO CHARGE NURSE ONLY: CPT | Performed by: DERMATOLOGY

## 2022-03-14 NOTE — PROGRESS NOTES
[facility] Dermatology Phototherapy Record  1. Cierra Suazo is a 26 year old female is here today for phototherapy (UVB) treatment for Dermatitis.        Changes or new medications since last treatment:   NO    New medical conditions or problems since last treatment:   NO    Any problems with last phototherapy treatment?    NO    2. The patient tolerated phototherapy without complication    Patient will return for next UVB treatment, per protocol.     Patient to see provider every 4-12 weeks for follow-up during treatment.      All questions and concerns discussed with patient in clinic today.      Cierra Suazo comes into clinic today at the request of Dr Palomares Ordering Provider for UVA-1.        This service provided today was under the supervising provider of the day Dr Liao, who was available if needed.    Leanne Luis RN

## 2022-03-17 ENCOUNTER — OFFICE VISIT (OUTPATIENT)
Dept: DERMATOLOGY | Facility: CLINIC | Age: 27
End: 2022-03-17
Payer: COMMERCIAL

## 2022-03-17 DIAGNOSIS — L30.9 DERMATITIS: ICD-10-CM

## 2022-03-17 PROCEDURE — 96900 ACTINOTHERAPY UV LIGHT: CPT | Performed by: DERMATOLOGY

## 2022-03-17 PROCEDURE — 99207 PR NO CHARGE NURSE ONLY: CPT | Performed by: DERMATOLOGY

## 2022-03-17 NOTE — PROGRESS NOTES
[facility] Dermatology Phototherapy Record  1. Cierra Suazo is a 26 year old female is here today for phototherapy (UVA-1) treatment for Dermatitis.        Changes or new medications since last treatment:   NO    New medical conditions or problems since last treatment:   NO    Any problems with last phototherapy treatment?    NO    2. The patient tolerated phototherapy without complication    Patient will return for next UVA treatment, per protocol.     Patient to see provider every 4-12 weeks for follow-up during treatment.      All questions and concerns discussed with patient in clinic today.          Cierra Suazo comes into clinic today at the request of Dr Palomares Ordering Provider for [facility].    This service provided today was under the supervising provider of the day Dr Liao, who was available if needed.    Leanne Luis RN

## 2022-03-18 ENCOUNTER — LAB (OUTPATIENT)
Dept: LAB | Facility: CLINIC | Age: 27
End: 2022-03-18
Payer: COMMERCIAL

## 2022-03-18 DIAGNOSIS — Z79.899 ENCOUNTER FOR LONG-TERM CURRENT USE OF HIGH RISK MEDICATION: ICD-10-CM

## 2022-03-18 LAB
BASOPHILS # BLD AUTO: 0.1 10E3/UL (ref 0–0.2)
BASOPHILS NFR BLD AUTO: 1 %
EOSINOPHIL # BLD AUTO: 0.1 10E3/UL (ref 0–0.7)
EOSINOPHIL NFR BLD AUTO: 1 %
ERYTHROCYTE [DISTWIDTH] IN BLOOD BY AUTOMATED COUNT: 14.1 % (ref 10–15)
HCT VFR BLD AUTO: 39.7 % (ref 35–47)
HGB BLD-MCNC: 12.9 G/DL (ref 11.7–15.7)
LYMPHOCYTES # BLD AUTO: 2.5 10E3/UL (ref 0.8–5.3)
LYMPHOCYTES NFR BLD AUTO: 28 %
MCH RBC QN AUTO: 28.5 PG (ref 26.5–33)
MCHC RBC AUTO-ENTMCNC: 32.5 G/DL (ref 31.5–36.5)
MCV RBC AUTO: 88 FL (ref 78–100)
MONOCYTES # BLD AUTO: 0.8 10E3/UL (ref 0–1.3)
MONOCYTES NFR BLD AUTO: 9 %
NEUTROPHILS # BLD AUTO: 5.6 10E3/UL (ref 1.6–8.3)
NEUTROPHILS NFR BLD AUTO: 62 %
PLATELET # BLD AUTO: 242 10E3/UL (ref 150–450)
RBC # BLD AUTO: 4.52 10E6/UL (ref 3.8–5.2)
WBC # BLD AUTO: 9 10E3/UL (ref 4–11)

## 2022-03-18 PROCEDURE — 80053 COMPREHEN METABOLIC PANEL: CPT

## 2022-03-18 PROCEDURE — 85025 COMPLETE CBC W/AUTO DIFF WBC: CPT

## 2022-03-18 PROCEDURE — 36415 COLL VENOUS BLD VENIPUNCTURE: CPT

## 2022-03-19 LAB
ALBUMIN SERPL-MCNC: 4 G/DL (ref 3.4–5)
ALP SERPL-CCNC: 45 U/L (ref 40–150)
ALT SERPL W P-5'-P-CCNC: 20 U/L (ref 0–50)
ANION GAP SERPL CALCULATED.3IONS-SCNC: 7 MMOL/L (ref 3–14)
AST SERPL W P-5'-P-CCNC: 20 U/L (ref 0–45)
BILIRUB SERPL-MCNC: 1.3 MG/DL (ref 0.2–1.3)
BUN SERPL-MCNC: 14 MG/DL (ref 7–30)
CALCIUM SERPL-MCNC: 9.6 MG/DL (ref 8.5–10.1)
CHLORIDE BLD-SCNC: 109 MMOL/L (ref 94–109)
CO2 SERPL-SCNC: 24 MMOL/L (ref 20–32)
CREAT SERPL-MCNC: 0.66 MG/DL (ref 0.52–1.04)
GFR SERPL CREATININE-BSD FRML MDRD: >90 ML/MIN/1.73M2
GLUCOSE BLD-MCNC: 93 MG/DL (ref 70–99)
POTASSIUM BLD-SCNC: 4.1 MMOL/L (ref 3.4–5.3)
PROT SERPL-MCNC: 7.6 G/DL (ref 6.8–8.8)
SODIUM SERPL-SCNC: 140 MMOL/L (ref 133–144)

## 2022-03-22 ENCOUNTER — OFFICE VISIT (OUTPATIENT)
Dept: DERMATOLOGY | Facility: CLINIC | Age: 27
End: 2022-03-22
Payer: COMMERCIAL

## 2022-03-22 DIAGNOSIS — L30.9 DERMATITIS: ICD-10-CM

## 2022-03-22 PROCEDURE — 96900 ACTINOTHERAPY UV LIGHT: CPT | Performed by: DERMATOLOGY

## 2022-03-22 NOTE — PROGRESS NOTES
[facility] Dermatology Phototherapy Record  1. Cierra Suazo is a 26 year old female is here today for phototherapy (UVA-1) treatment for Dermatitis.        Changes or new medications since last treatment:   NO    New medical conditions or problems since last treatment:   NO    Any problems with last phototherapy treatment?    NO    2. The patient tolerated phototherapy without complication    Patient will return for next UVA treatment, per protocol.     Patient to see provider every 4-12 weeks for follow-up during treatment.      All questions and concerns discussed with patient in clinic today.      Cierra Suazo comes into clinic today at the request of Dr Palomares Ordering Provider for UVA-1.    This service provided today was under the supervising provider of the day Dr Liao, who was available if needed.    Leanne Luis RN

## 2022-03-29 ENCOUNTER — OFFICE VISIT (OUTPATIENT)
Dept: DERMATOLOGY | Facility: CLINIC | Age: 27
End: 2022-03-29
Payer: COMMERCIAL

## 2022-03-29 DIAGNOSIS — L30.9 DERMATITIS: ICD-10-CM

## 2022-03-29 PROCEDURE — 96900 ACTINOTHERAPY UV LIGHT: CPT | Performed by: DERMATOLOGY

## 2022-03-29 NOTE — NURSING NOTE
Cierra Suazo comes into clinic today at the request of Dr. Palomares Ordering Provider for [facility].      This service provided today was under the supervising provider of the day Dr. Palomares, who was available if needed.    Jaja Cantu, EMT

## 2022-03-29 NOTE — PROGRESS NOTES
[facility] Dermatology Phototherapy Record  1. Cierra Suazo is a 26 year old female is here today for phototherapy (UVA) treatment for Dermatitis [L30.9].        Changes or new medications since last treatment:   NO    New medical conditions or problems since last treatment:   NO    Any problems with last phototherapy treatment?    NO    2. The patient tolerated phototherapy without complication    Patient will return on for next UVA treatment, per protocol.     Patient to see provider every 4-12 weeks for follow-up during treatment.      All questions and concerns discussed with patient in clinic today.      Jaja Cantu, EMT

## 2022-03-31 ENCOUNTER — OFFICE VISIT (OUTPATIENT)
Dept: DERMATOLOGY | Facility: CLINIC | Age: 27
End: 2022-03-31
Payer: COMMERCIAL

## 2022-03-31 DIAGNOSIS — L30.9 DERMATITIS: ICD-10-CM

## 2022-03-31 PROCEDURE — 96900 ACTINOTHERAPY UV LIGHT: CPT

## 2022-03-31 NOTE — PROGRESS NOTES
[facility] Dermatology Phototherapy Record  1. Cierra Suazo is a 26 year old female is here today for phototherapy (UVA-1) treatment for Dermatitis.        Changes or new medications since last treatment:   NO    New medical conditions or problems since last treatment:   NO    Any problems with last phototherapy treatment?    NO    2. The patient tolerated phototherapy without complication    Patient will return for next UVA treatment, per protocol.     Patient to see provider every 4-12 weeks for follow-up during treatment.      All questions and concerns discussed with patient in clinic today.        Cierra Suazo comes into clinic today at the request of Dr Palomares Ordering Provider for UVA-1.        This service provided today was under the supervising provider of the day Dr Oliva, who was available if needed.    Leanne Luis RN

## 2022-04-05 ENCOUNTER — OFFICE VISIT (OUTPATIENT)
Dept: DERMATOLOGY | Facility: CLINIC | Age: 27
End: 2022-04-05
Payer: COMMERCIAL

## 2022-04-05 DIAGNOSIS — L30.9 DERMATITIS: ICD-10-CM

## 2022-04-05 PROCEDURE — 96900 ACTINOTHERAPY UV LIGHT: CPT | Performed by: DERMATOLOGY

## 2022-04-05 PROCEDURE — 99207 PR NO CHARGE NURSE ONLY: CPT | Performed by: DERMATOLOGY

## 2022-04-05 NOTE — PROGRESS NOTES
[facility] Dermatology Phototherapy Record  1. Cierra Suazo is a 26 year old female is here today for phototherapy (UVA-1) treatment for Dermatitis .        Changes or new medications since last treatment:   NO    New medical conditions or problems since last treatment:   NO    Any problems with last phototherapy treatment?    NO    2. The patient tolerated phototherapy without complication    Patient will return for next UVA treatment, per protocol.     Patient to see provider every 4-12 weeks for follow-up during treatment.      All questions and concerns discussed with patient in clinic today.      Cierra Suazo comes into clinic today at the request of Dr Palomares Ordering Provider for UVA-1.    This service provided today was under the supervising provider of the day Dr Liao, who was available if needed.    Leanne Luis RN

## 2022-04-12 ENCOUNTER — OFFICE VISIT (OUTPATIENT)
Dept: DERMATOLOGY | Facility: CLINIC | Age: 27
End: 2022-04-12
Payer: COMMERCIAL

## 2022-04-12 DIAGNOSIS — L30.9 DERMATITIS: ICD-10-CM

## 2022-04-12 PROCEDURE — 96900 ACTINOTHERAPY UV LIGHT: CPT | Performed by: DERMATOLOGY

## 2022-04-12 PROCEDURE — 99207 PR NO CHARGE NURSE ONLY: CPT | Performed by: DERMATOLOGY

## 2022-04-12 NOTE — PROGRESS NOTES
[facility] Dermatology Phototherapy Record  1. Cierra Suazo is a 26 year old female is here today for phototherapy (UVA-1) treatment for Dermatitis.        Changes or new medications since last treatment:   NO    New medical conditions or problems since last treatment:   NO    Any problems with last phototherapy treatment?    NO    2. The patient tolerated phototherapy without complication    Patient will return for next UVA treatment, per protocol.     Patient to see provider every 4-12 weeks for follow-up during treatment.      All questions and concerns discussed with patient in clinic today.      Cierra Suazo comes into clinic today at the request of Dr Palomares Ordering Provider for [facility].    This service provided today was under the supervising provider of the day Dr Liao, who was available if needed.    Leanne Luis RN

## 2022-04-19 ENCOUNTER — OFFICE VISIT (OUTPATIENT)
Dept: DERMATOLOGY | Facility: CLINIC | Age: 27
End: 2022-04-19
Payer: COMMERCIAL

## 2022-04-19 DIAGNOSIS — L30.9 DERMATITIS: ICD-10-CM

## 2022-04-19 PROCEDURE — 96900 ACTINOTHERAPY UV LIGHT: CPT | Performed by: DERMATOLOGY

## 2022-04-19 NOTE — PROGRESS NOTES
[facility] Dermatology Phototherapy Record  1. Cierra Suazo is a 26 year old female is here today for phototherapy (UVA-1) treatment for Dermatitis .        Changes or new medications since last treatment:   NO    New medical conditions or problems since last treatment:   NO    Any problems with last phototherapy treatment?    NO    2. The patient tolerated phototherapy without complication    Patient will return for next UVA treatment, per protocol.     Patient to see provider every 4-12 weeks for follow-up during treatment.      All questions and concerns discussed with patient in clinic today.      Cierra Suazo comes into clinic today at the request of Dr Palomares Ordering Provider for UVA-1.    This service provided today was under the supervising provider of the day Dr Liao, who was available if needed.    Leanne Luis RN

## 2022-04-21 ENCOUNTER — OFFICE VISIT (OUTPATIENT)
Dept: DERMATOLOGY | Facility: CLINIC | Age: 27
End: 2022-04-21
Payer: COMMERCIAL

## 2022-04-21 DIAGNOSIS — L30.9 DERMATITIS: ICD-10-CM

## 2022-04-21 PROCEDURE — 96900 ACTINOTHERAPY UV LIGHT: CPT | Performed by: DERMATOLOGY

## 2022-04-21 PROCEDURE — 99207 PR NO CHARGE NURSE ONLY: CPT | Performed by: DERMATOLOGY

## 2022-04-21 NOTE — PROGRESS NOTES
[facility] Dermatology Phototherapy Record  1. Cierra Suazo is a 26 year old female is here today for phototherapy (UVA-1) treatment for Dermatitis .        Changes or new medications since last treatment:   NO    New medical conditions or problems since last treatment:   NO    Any problems with last phototherapy treatment?    NO    2. The patient tolerated phototherapy without complication    Patient will return for next UVA treatment, per protocol.     Patient to see provider every 4-12 weeks for follow-up during treatment.      All questions and concerns discussed with patient in clinic today.      Cierra Suazo comes into clinic today at the request of Dr Palomares Ordering Provider for UVA-1.    This service provided today was under the supervising provider of the day Dr Liao, who was available if needed.    Leanne Luis RN

## 2022-04-28 ENCOUNTER — OFFICE VISIT (OUTPATIENT)
Dept: DERMATOLOGY | Facility: CLINIC | Age: 27
End: 2022-04-28
Payer: COMMERCIAL

## 2022-04-28 ENCOUNTER — TELEPHONE (OUTPATIENT)
Dept: DERMATOLOGY | Facility: CLINIC | Age: 27
End: 2022-04-28

## 2022-04-28 DIAGNOSIS — L94.0 MORPHEA: ICD-10-CM

## 2022-04-28 DIAGNOSIS — Z79.899 ENCOUNTER FOR LONG-TERM CURRENT USE OF HIGH RISK MEDICATION: Primary | ICD-10-CM

## 2022-04-28 PROCEDURE — 11900 INJECT SKIN LESIONS </W 7: CPT | Mod: GC | Performed by: DERMATOLOGY

## 2022-04-28 PROCEDURE — 99214 OFFICE O/P EST MOD 30 MIN: CPT | Mod: 25 | Performed by: DERMATOLOGY

## 2022-04-28 RX ORDER — CALCIPOTRIENE 50 UG/G
CREAM TOPICAL
Qty: 120 G | Refills: 1 | Status: SHIPPED | OUTPATIENT
Start: 2022-04-28

## 2022-04-28 ASSESSMENT — PAIN SCALES - GENERAL: PAINLEVEL: NO PAIN (0)

## 2022-04-28 NOTE — PROGRESS NOTES
[facility] Dermatology Note  Encounter Date: Apr 28, 2022  Office Visit     Dermatology Problem List:  1. Morphea  - biopsy on the back at derm surg c/w morphea (per patient) 2/2021  - current: has had x21 txs of UVA1 and continues on mycophenolate 1000 BID. cont clobetasol and calcipotriene.   - Kenalog 10 mg lip injection to soften scar  - prior: methotrexate 20 mg q week (morphea was stable but not improving so transitioned to cellcept 9/28/21), fillers ~8 years ago    ____________________________________________    Assessment & Plan:     # Morphea, chronic problem/active.  No evidence of worsening of skin tightening on the lips, fingers, back, or torso. Slightly increased hyperpigmentation on the back and torso with light therapy, been to 21x sessions so far. Has continued cellcept.  Discussed prognosis and treatment options of morphea. Discussed risks/benefits of UVA including theoretical increase in skin cancer risk over lifetime.   - Discussed options for cosmetic fillers to help with lip asymmetry, in future when disease is not active  - Discussed potentially doing steroid injection for face deformity at next visit  - Continue UVA1 phototherapy, aim for at least 20 treatments before deciding on response  - Continue cellcept 1000 mg BID  - Ordered CBC w/ diff, CMP as [id number] safety labs  - Continue clobetasol and calcipotriene  - Cosmetic referral - consider CO2 laser and/or fillers for lower lip defect    Procedures Performed:   - Intra-lesional triamcinolone procedure note. After positioning and cleansing with isopropyl alcohol, 0.1 total mL of triamcinolone 10 mg/mL was injected into one lesion(s) on the lower lip/submental areas. The patient tolerated the procedure well and left the dermatology clinic in good condition.    Follow-up: 4 month(s) in-person, or earlier for new or changing lesions    Staff and Resident:     Nena Boogie MD  [facility] Resident    I have  seen and examined this patient and agree with the assessment and plan as documented in the resident's note, and was present for all procedures.    Luis Palomares MD  Dermatology Attending    ____________________________________________    CC: Derm Problem (Morphea)    HPI:  Ms. Cierra Suazo is a 26 year old female who presents as a follow-up for morphea. The patient was last seen November 2021 by Dr Palomares.     Patient is curious about next steps. She mentioned a cortisone shot. Her  feels like the area on her face has loosened up. She is not sure, although feels like the light therapy has helped the areas on her body. The color has changed to more of brown color. Comes to phototherapy twice a week. Feels she has gone to 20-30 times of light treatment. She is open to whatever treatment plan is recommended.     Patient is otherwise feeling well, without additional skin concerns.    Labs Reviewed:  Reviewed CBC and CMP from March 2022    Physical Exam:  Vitals: There were no vitals taken for this visit.  GEN: This is a well developed, well-nourished female in no acute distress, in a pleasant mood.    SKIN: Focused examination of face, torso/trunk, and back was performed.  -There is a indented stiff plaque on the left lower cutaneous lip with a groove-sign  -There is a firm skin-colored plaque that is mildly hyperpigmented with light pink tinge in a widened geographic distribution located on the mid back crossing midline spanning towards the b/l lateral flank and anterior right torso.   -No other lesions of concern on areas examined.     Medications:  Current Outpatient Medications   Medication     calcipotriene (DOVONOX) 0.005 % external cream     acetaminophen (TYLENOL) 325 MG tablet     clobetasol (TEMOVATE) 0.05 % external cream     escitalopram (LEXAPRO) 5 MG tablet     fluconazole (DIFLUCAN) 150 MG tablet     folic acid (FOLVITE) 1 MG tablet     ibuprofen (ADVIL/MOTRIN) 800 MG tablet      methotrexate 2.5 MG tablet     metroNIDAZOLE (FLAGYL) 500 MG tablet     mycophenolate (GENERIC EQUIVALENT) 500 MG tablet     Prenatal Vit-Fe Fumarate-FA (PNV PRENATAL PLUS MULTIVITAMIN) 27-1 MG TABS per tablet     valACYclovir (VALTREX) 500 MG tablet     No current facility-administered medications for this visit.      Past Medical History:   Patient Active Problem List   Diagnosis     Labor and delivery, indication for care      (normal spontaneous vaginal delivery)     Morphea     Dermatitis     Encounter for long-term current use of high risk medication     Past Medical History:   Diagnosis Date     Depressive disorder     anxiety     Herpes genitalia        CC Referred Self  No address on file on close of this encounter.

## 2022-04-28 NOTE — NURSING NOTE
Drug Administration Record    Prior to injection, verified patient identity using patient's name and date of birth.  Due to injection administration, patient instructed to remain in clinic for 15 minutes  afterwards, and to report any adverse reaction to me immediately.    Drug Name: triamcinolone acetonide(kenalog)  Dose: 0.5mL of triamcinolone 10mg/mL, 5mg dose  Route administered: ID  NDC #: Kenalog-10 (5003-6763-30)  Amount of waste(mL):4.5mL  Reason for waste: Multi dose vial    LOT #: [id number]  SITE: see provider note  : [facility]  EXPIRATION DATE: 05/2023

## 2022-04-28 NOTE — TELEPHONE ENCOUNTER
[facility] Call Center    Phone Message    May a detailed message be left on voicemail: yes     Reason for Call: Other: Pt is requesting to have a care team member call her to get her scheduled for a few light therapy appointments, as ordered by Dr Luis Palomares. Thanks!     Action Taken: Message routed to:  [facility] Dermatology    Travel Screening: Not Applicable

## 2022-04-28 NOTE — LETTER
Date:May 22, 2022      Provider requested that no letter be sent. Do not send.       [facility]

## 2022-04-28 NOTE — NURSING NOTE
Dermatology Rooming Note    Cierra Suazo's goals for this visit include:   Chief Complaint   Patient presents with     Derm Problem     Burak Wallace, Visit Facilitator

## 2022-04-28 NOTE — LETTER
4/28/2022       RE: Cierra Suazo  [address]  [place] [address]     Dear Colleague,    Thank you for referring your patient, Cierra Suazo, to the [facility] DERMATOLOGY [facility]INIC [place] at [facility]. Please see a copy of my visit note below.    [facility] Dermatology Note  Encounter Date: Apr 28, 2022  Office Visit     Dermatology Problem List:  1. Morphea  - biopsy on the back at derm surg c/w morphea (per patient) 2/2021  - current: has had x21 txs of UVA1 and continues on mycophenolate 1000 BID. cont clobetasol and calcipotriene.   - Kenalog 10 mg lip injection to soften scar  - prior: methotrexate 20 mg q week (morphea was stable but not improving so transitioned to cellcept 9/28/21), fillers ~8 years ago    ____________________________________________    Assessment & Plan:     # Morphea, chronic problem/active.  No evidence of worsening of skin tightening on the lips, fingers, back, or torso. Slightly increased hyperpigmentation on the back and torso with light therapy, been to 21x sessions so far. Has continued cellcept.  Discussed prognosis and treatment options of morphea. Discussed risks/benefits of UVA including theoretical increase in skin cancer risk over lifetime.   - Discussed options for cosmetic fillers to help with lip asymmetry, in future when disease is not active  - Discussed potentially doing steroid injection for face deformity at next visit  - Continue UVA1 phototherapy, aim for at least 20 treatments before deciding on response  - Continue cellcept 1000 mg BID  - Ordered CBC w/ diff, CMP as [id number] safety labs  - Continue clobetasol and calcipotriene  - Cosmetic referral - consider CO2 laser and/or fillers for lower lip defect    Procedures Performed:   - Intra-lesional triamcinolone procedure note. After positioning and cleansing with isopropyl alcohol, 0.1 total mL of triamcinolone 10 mg/mL was  injected into one lesion(s) on the lower lip/submental areas. The patient tolerated the procedure well and left the dermatology clinic in good condition.    Follow-up: 4 month(s) in-person, or earlier for new or changing lesions    Staff and Resident:     Nena Boogie MD  [facility] Resident    I have seen and examined this patient and agree with the assessment and plan as documented in the resident's note, and was present for all procedures.    Luis Palomares MD  Dermatology Attending    ____________________________________________    CC: Derm Problem (Morphea)    HPI:  Ms. Cierra Suazo is a 26 year old female who presents as a follow-up for morphea. The patient was last seen November 2021 by Dr Palomares.     Patient is curious about next steps. She mentioned a cortisone shot. Her  feels like the area on her face has loosened up. She is not sure, although feels like the light therapy has helped the areas on her body. The color has changed to more of brown color. Comes to phototherapy twice a week. Feels she has gone to 20-30 times of light treatment. She is open to whatever treatment plan is recommended.     Patient is otherwise feeling well, without additional skin concerns.    Labs Reviewed:  Reviewed CBC and CMP from March 2022    Physical Exam:  Vitals: There were no vitals taken for this visit.  GEN: This is a well developed, well-nourished female in no acute distress, in a pleasant mood.    SKIN: Focused examination of face, torso/trunk, and back was performed.  -There is a indented stiff plaque on the left lower cutaneous lip with a groove-sign  -There is a firm skin-colored plaque that is mildly hyperpigmented with light pink tinge in a widened geographic distribution located on the mid back crossing midline spanning towards the b/l lateral flank and anterior right torso.   -No other lesions of concern on areas examined.     Medications:  Current Outpatient Medications    Medication     calcipotriene (DOVONOX) 0.005 % external cream     acetaminophen (TYLENOL) 325 MG tablet     clobetasol (TEMOVATE) 0.05 % external cream     escitalopram (LEXAPRO) 5 MG tablet     fluconazole (DIFLUCAN) 150 MG tablet     folic acid (FOLVITE) 1 MG tablet     ibuprofen (ADVIL/MOTRIN) 800 MG tablet     methotrexate 2.5 MG tablet     metroNIDAZOLE (FLAGYL) 500 MG tablet     mycophenolate (GENERIC EQUIVALENT) 500 MG tablet     Prenatal Vit-Fe Fumarate-FA (PNV PRENATAL PLUS MULTIVITAMIN) 27-1 MG TABS per tablet     valACYclovir (VALTREX) 500 MG tablet     No current facility-administered medications for this visit.      Past Medical History:   Patient Active Problem List   Diagnosis     Labor and delivery, indication for care      (normal spontaneous vaginal delivery)     Morphea     Dermatitis     Encounter for long-term current use of high risk medication     Past Medical History:   Diagnosis Date     Depressive disorder     anxiety     Herpes genitalia        CC Referred Self  No address on file on close of this encounter.          Again, thank you for allowing me to participate in the care of your patient.      Sincerely,    Luis Palomares MD

## 2022-05-11 NOTE — TELEPHONE ENCOUNTER
Last 5 Patient Entered Readings                                      Current 30 Day Average: 135/82     Recent Readings 5/27/2018 5/25/2018 5/24/2018 5/17/2018 5/15/2018    SBP (mmHg) 132 138 150 139 132    DBP (mmHg) 82 88 84 82 80    Pulse 79 81 73 82 87        Hypertension Medications             chlorthalidone (HYGROTEN) 25 MG Tab TAKE 1 TABLET BY MOUTH EVERY MORNING; DISCONTINUE HCTZ    irbesartan (AVAPRO) 300 MG tablet Take 1 tablet (300 mg total) by mouth every evening. Discontinue irbesartan 150mg RX.        Patient called back. Patient has been suffering from sinusitis, patient denies taking nasal decongestants.    Per newly released 2017 ACC/ AHA HTN guidelines (goal of BP < 130/80), current 30-day average is slightly uncontrolled.    Patient is scheduled to have a TKR (L) on 6/19.   Patient denies having questions or concerns. Instructed patient to call if she has any questions or concerns, patient confirms understanding.   Will continue to monitor. WCB in 1 month.   If BP remains elevated, will discuss adding another agent (coreg). Patient has been intolerant to amlodipine in the past.            The pt called back to get the light trmts scheduled. Please contact the pt. Thanks.

## 2022-05-12 ENCOUNTER — TELEPHONE (OUTPATIENT)
Dept: DERMATOLOGY | Facility: CLINIC | Age: 27
End: 2022-05-12
Payer: COMMERCIAL

## 2022-06-03 ENCOUNTER — OFFICE VISIT (OUTPATIENT)
Dept: DERMATOLOGY | Facility: CLINIC | Age: 27
End: 2022-06-03
Payer: COMMERCIAL

## 2022-06-03 DIAGNOSIS — L30.9 DERMATITIS: ICD-10-CM

## 2022-06-03 PROCEDURE — 96900 ACTINOTHERAPY UV LIGHT: CPT | Performed by: DERMATOLOGY

## 2022-06-03 NOTE — PROGRESS NOTES
[facility] Dermatology Phototherapy Record  1. Cierra Suazo is a 26 year old female is here today for phototherapy (UVA) treatment forDermatitis [L30.9]  .        Changes or new medications since last treatment (If yes, notify MD):   NO    New medical conditions (If yes, notify MD):   NO    Any problems with last phototherapy treatment(If yes, notify MD)?    NO    Patient denies any remaining skin redness since last treatment (If no, do not treat. Do not treat pink skin):  YES    The patient was [facility]foot [facility] N/A.     2. The patient tolerated phototherapy without complication    Patient will return for next UVA treatment, per protocol.     Patient to see provider every 4-12 weeks for follow-up during treatment.   (If no, notify treating physician) YES.     All questions and concerns discussed with patient in clinic today.      Yamilet Garcia, CMA

## 2022-06-03 NOTE — NURSING NOTE
Cierra Suazo comes into clinic today at the request of Dr. Palomares Ordering Provider    This service provided today was under the supervising provider of the day Dr. Monson, who was available if needed.    Yamilet Garcia, CMA

## 2022-06-13 ENCOUNTER — OFFICE VISIT (OUTPATIENT)
Dept: DERMATOLOGY | Facility: CLINIC | Age: 27
End: 2022-06-13
Payer: COMMERCIAL

## 2022-06-13 DIAGNOSIS — L30.9 DERMATITIS: ICD-10-CM

## 2022-06-13 PROCEDURE — 96900 ACTINOTHERAPY UV LIGHT: CPT | Performed by: DERMATOLOGY

## 2022-06-13 PROCEDURE — 99207 PR NO CHARGE NURSE ONLY: CPT | Performed by: DERMATOLOGY

## 2022-06-13 NOTE — PROGRESS NOTES
[facility] Dermatology Phototherapy Record  1. Cierra Suazo is a 26 year old female is here today for phototherapy (UVA-1) treatment for Dermatitis.        Changes or new medications since last treatment (If yes, notify MD):   NO    New medical conditions (If yes, notify MD):   NO    Any problems with last phototherapy treatment(If yes, notify MD)?    NO    Patient denies any remaining skin redness since last treatment (If no, do not treat.  YES      2. The patient tolerated phototherapy without complication    Patient will return for next UVA treatment, per protocol.     Patient to see provider every 4-12 weeks for follow-up during treatment.   (If no, notify treating physician) YES.     All questions and concerns discussed with patient in clinic today.        Cierra Suazo comes into clinic today at the request of Dr Palomares Ordering Provider for UUVA-1.    This service provided today was under the supervising provider of the day Dr Liao, who was available if needed.    Leanne Luis RN

## 2022-06-17 ENCOUNTER — OFFICE VISIT (OUTPATIENT)
Dept: DERMATOLOGY | Facility: CLINIC | Age: 27
End: 2022-06-17
Payer: COMMERCIAL

## 2022-06-17 DIAGNOSIS — L30.9 DERMATITIS: ICD-10-CM

## 2022-06-17 PROCEDURE — 96900 ACTINOTHERAPY UV LIGHT: CPT | Performed by: DERMATOLOGY

## 2022-06-17 NOTE — PROGRESS NOTES
[facility] Dermatology Phototherapy Record  1. Cierra Suazo is a 26 year old female is here today for phototherapy (UVB) treatment for Dermatitis [L30.9] .        Changes or new medications since last treatment (If yes, notify MD):   NO    New medical conditions (If yes, notify MD):   NO    Any problems with last phototherapy treatment(If yes, notify MD)?    NO    Patient denies any remaining skin redness since last treatment (If no, do not treat. Do not treat pink skin):  YES    The patient was offered umderuvbhandfoot or umderuvbfullbody AVS : NO.     2. The patient tolerated phototherapy without complication    Patient will return on  for next UVB treatment, per protocol.     Patient to see provider every 4-12 weeks for follow-up during treatment.   (If no, notify treating physician) YES.     All questions and concerns discussed with patient in clinic today.      Cierra Suazo comes into clinic today at the request of Dr. Palomares Ordering Provider for UVA1.        This service provided today was under the supervising provider of the day Dr. Monson, who was available if needed.    Melany Rosas, [facility]

## 2022-06-20 ENCOUNTER — OFFICE VISIT (OUTPATIENT)
Dept: DERMATOLOGY | Facility: CLINIC | Age: 27
End: 2022-06-20
Payer: COMMERCIAL

## 2022-06-20 DIAGNOSIS — L30.9 DERMATITIS: ICD-10-CM

## 2022-06-20 PROCEDURE — 96900 ACTINOTHERAPY UV LIGHT: CPT | Performed by: DERMATOLOGY

## 2022-06-20 NOTE — PROGRESS NOTES
[facility] Dermatology Phototherapy Record  1. Cierar Suazo is a 26 year old female is here today for phototherapy (UVA) treatment for Dermatitis [L30.9] .        Changes or new medications since last treatment (If yes, notify MD):   NO    New medical conditions (If yes, notify MD):   NO    Any problems with last phototherapy treatment(If yes, notify MD)?    NO    Patient denies any remaining skin redness since last treatment (If no, do not treat. Do not treat pink skin):  YES    2. The patient tolerated phototherapy without complication    Patient will return on for next UVA treatment, per protocol.     Patient to see provider every 4-12 weeks for follow-up during treatment.   (If no, notify treating physician) YES.     All questions and concerns discussed with patient in clinic today.      Jaja Cantu, EMT

## 2022-06-20 NOTE — NURSING NOTE
Cierra Suazo comes into clinic today at the request of Dr. Palomares Ordering Provider for [facility].      This service provided today was under the supervising provider of the day Dr. Liao, who was available if needed.    Jaja Cantu, EMT

## 2022-06-27 ENCOUNTER — OFFICE VISIT (OUTPATIENT)
Dept: DERMATOLOGY | Facility: CLINIC | Age: 27
End: 2022-06-27
Payer: COMMERCIAL

## 2022-06-27 DIAGNOSIS — L30.9 DERMATITIS: ICD-10-CM

## 2022-06-27 PROCEDURE — 96900 ACTINOTHERAPY UV LIGHT: CPT | Performed by: DERMATOLOGY

## 2022-06-27 NOTE — PROGRESS NOTES
[facility] Dermatology Phototherapy Record  1. Cierra Suazo is a 26 year old female is here today for phototherapy (UVB) treatment for Dermatitis [L30.9] .        Changes or new medications since last treatment (If yes, notify MD):   NO    New medical conditions (If yes, notify MD):   NO    Any problems with last phototherapy treatment(If yes, notify MD)?    NO    Patient denies any remaining skin redness since last treatment (If no, do not treat. Do not treat red skin):  YES    The patient was offered [facility]handfoot [facility] : NO.     2. The patient tolerated phototherapy without complication    Patient will return per protocol for next UVB treatment, per protocol.     Patient to see provider every 4-12 weeks for follow-up during treatment.   (If no, notify treating physician) YES.     All questions and concerns discussed with patient in clinic today.      Cierra Suazo comes into clinic today at the request of Dr. Palomares  Ordering Provider for UVA1.        This service provided today was under the supervising provider of the day Dr. Liao, who was available if needed.    Melany Rosas, [facility]

## 2022-07-06 ENCOUNTER — OFFICE VISIT (OUTPATIENT)
Dept: DERMATOLOGY | Facility: CLINIC | Age: 27
End: 2022-07-06
Payer: COMMERCIAL

## 2022-07-06 DIAGNOSIS — L30.9 DERMATITIS: ICD-10-CM

## 2022-07-06 PROCEDURE — 96900 ACTINOTHERAPY UV LIGHT: CPT

## 2022-07-06 NOTE — PROGRESS NOTES
Cierra Suazo comes into clinic today at the request of Dr. Palomares Ordering Provider for [facility].      This service provided today was under the supervising provider of the day Dr. Liao, who was available if needed.    Alon Vera, [facility]

## 2022-07-06 NOTE — PROGRESS NOTES
[facility] Dermatology Phototherapy Record  1. Cierra Sauzo is a 26 year old female is here today for phototherapy (UVB) treatment for Dermatitis [L30.9] .        Changes or new medications since last treatment (If yes, notify MD):  NO    New medical conditions (If yes, notify MD):  NO    Any problems with last phototherapy treatment (If yes, notify MD)?  NO    Patient denies any remaining skin redness since last treatment (If no, do not treat. Do not treat red skin):  NO    The patient was offered [facility]handfoot [facility] : NO.     2. The patient tolerated phototherapy without complication.    Patient will return per protocol for next UVB treatment, per protocol.     Patient to see provider every 4-12 weeks for follow-up during treatment (if no, notify treating physician):  N/A.     All questions and concerns discussed with patient in clinic today.    Alon Vera, [facility]

## 2022-07-08 ENCOUNTER — OFFICE VISIT (OUTPATIENT)
Dept: DERMATOLOGY | Facility: CLINIC | Age: 27
End: 2022-07-08
Payer: COMMERCIAL

## 2022-07-08 DIAGNOSIS — L30.9 DERMATITIS: ICD-10-CM

## 2022-07-08 PROCEDURE — 96900 ACTINOTHERAPY UV LIGHT: CPT

## 2022-07-08 NOTE — NURSING NOTE
Cierra Suazo comes into clinic today at the request of Dr. Palomares Ordering Provider for [facility].      This service provided today was under the supervising provider of the day Dr. Monson, who was available if needed.    Jaja Cantu, EMT

## 2022-07-08 NOTE — PROGRESS NOTES
[facility] Dermatology Phototherapy Record  1. Cierra Suazo is a 26 year old female is here today for phototherapy (UVA) treatment for Dermatitis [L30.9] .        Changes or new medications since last treatment (If yes, notify MD):  NO    New medical conditions (If yes, notify MD):  NO    Any problems with last phototherapy treatment (If yes, notify MD)?  NO    Patient denies any remaining skin redness since last treatment (If no, do not treat. Do not treat red skin):  YES    2. The patient tolerated phototherapy without complication.    Patient will return per protocol for next UVA treatment, per protocol.     Patient to see provider every 4-12 weeks for follow-up during treatment (if no, notify treating physician):  YES.     All questions and concerns discussed with patient in clinic today.    Jaja Cantu, EMT

## 2022-07-20 ENCOUNTER — OFFICE VISIT (OUTPATIENT)
Dept: DERMATOLOGY | Facility: CLINIC | Age: 27
End: 2022-07-20
Payer: COMMERCIAL

## 2022-07-20 DIAGNOSIS — L30.9 DERMATITIS: ICD-10-CM

## 2022-07-20 PROCEDURE — 99207 PR NO CHARGE NURSE ONLY: CPT | Performed by: DERMATOLOGY

## 2022-07-20 PROCEDURE — 96900 ACTINOTHERAPY UV LIGHT: CPT | Performed by: DERMATOLOGY

## 2022-07-20 NOTE — PROGRESS NOTES
[facility] Dermatology Phototherapy Record  1. Cierra Suazo is a 26 year old female is here today for phototherapy (UVA) treatment for Dermatitis [L30.9] .        Changes or new medications since last treatment (If yes, notify MD):  NO    New medical conditions (If yes, notify MD):  NO    Any problems with last phototherapy treatment (If yes, notify MD)?  NO    Patient denies any remaining skin redness since last treatment (If no, do not treat. Do not treat red skin):  YES    2. The patient tolerated phototherapy without complication.    Patient will return per protocol for next UVA treatment, per protocol.     Patient to see provider every 4-12 weeks for follow-up during treatment (if no, notify treating physician):  YES.     All questions and concerns discussed with patient in clinic today.    Cierra Suazo comes into clinic today at the request of Dr. Palomares Ordering Provider for UVA-1.    This service provided today was under the supervising provider of the day Dr. Liao, who was available if needed.    Annamaria Crews RN

## 2022-08-09 ENCOUNTER — OFFICE VISIT (OUTPATIENT)
Dept: DERMATOLOGY | Facility: CLINIC | Age: 27
End: 2022-08-09
Payer: COMMERCIAL

## 2022-08-09 DIAGNOSIS — Z79.899 ENCOUNTER FOR LONG-TERM (CURRENT) USE OF HIGH-RISK MEDICATION: Primary | ICD-10-CM

## 2022-08-09 DIAGNOSIS — L94.0 MORPHEA: ICD-10-CM

## 2022-08-09 PROCEDURE — 99214 OFFICE O/P EST MOD 30 MIN: CPT | Performed by: DERMATOLOGY

## 2022-08-09 RX ORDER — HYDROQUINONE 40 MG/G
CREAM TOPICAL
COMMUNITY

## 2022-08-09 ASSESSMENT — PAIN SCALES - GENERAL: PAINLEVEL: NO PAIN (0)

## 2022-08-09 NOTE — PROGRESS NOTES
[facility] Dermatology Note  Encounter Date: Aug 9, 2022  Office Visit     Dermatology Problem List:  1. Morphea  - biopsy on the back at derm surg c/w morphea (per patient) 2/2021  - current: has had x21 txs of UVA1, discharge UV tx   - decrease current dosage to 1500mg daily (3 pills daily), may continue tapering down by 1 pill every 2 to 3 weeks as tolerated  - prior: methotrexate 20 mg q week (morphea was stable but not improving so transitioned to cellcept 9/28/21), fillers ~8 years ago; Kenalog 10 mg lip injection to soften scar; s/p  left lower cutaneous lip  ____________________________________________    Assessment & Plan:    1. Morphea  - biopsy on the back at derm surg c/w morphea (per patient) 2/2021  - current: has had x21 txs of UVA1 ; continues on mycophenolate 1000 BID  -May decrease current dosage to 1500mg daily (3 pills daily), may continue tapering down by 1 pill every 2 to 3 weeks as tolerated  - prior: methotrexate 20 mg q week (morphea was stable but not improving so transitioned to cellcept 9/28/21), fillers ~8 years ago; Kenalog 10 mg lip injection to soften scar; s/p  left lower cutaneous lip  -May continue UVA1 per patient preference  -CBC, CMP ordered today    Procedures Performed:   None      Follow-up: 3 month(s) in-person, or earlier for new or changing lesions    Staff and Medical Student:       Pt seen and discussed with attending physician, Dr Palomares.     Marii Conway, MS4    I was present with the medical student who participated in the service and in the documentation.  I have verified the history and personally performed the physical exam and medical decision making.  I agree with the assessment and plan of care as documented in the note.      Luis Palomares MD  Dermatology Attending    ____________________________________________    CC: Derm Problem (Cierra is here today for morphea. She states that her skin has been the same. )    HPI:  Ms.  Cierra Suazo is a(n) 26 year old female who presents today as a return patient for morphea f/u.  Patient was last seen April 28 2022, at which point patient had left lower cutaneous lip injected with Kenalog.  Patient has since had favorable results.  Patient additionally got  left lower cutaneous lip, flattening out the area.  Today, patient is curious about next steps.  Patient reports that she has not been doing UV light treatments for the past several weeks due to concerns about cost and issues with insurance coverage.  Patient continues to take mycophenolate 1000 twice daily.  Has discontinued clobetasol and calcipotriene, however has started hydroquinone on affected areas.  Patient feels like areas along the back are more of a light brown now.    Patient is otherwise feeling well, without additional skin concerns.    Labs:  CBC , CMP reviewed.    Physical Exam:  Vitals: There were no vitals taken for this visit.  SKIN: Total skin excluding the undergarment areas was performed. The exam included the head/face, neck, both arms, chest, back, abdomen, both legs, digits and/or nails.   -Moderate indentation on the left lower cutaneous lip, improved from prior  - firm skin-colored plaque that is mildly hyperpigmented in a widened geographic distribution located on the mid back crossing midline spanning towards the b/l lateral flank and anterior right torso; no longer has pink tinge, now appears more light brown  - No other lesions of concern on areas examined.     Medications:  Current Outpatient Medications   Medication     calcipotriene (DOVONOX) 0.005 % external cream     acetaminophen (TYLENOL) 325 MG tablet     clobetasol (TEMOVATE) 0.05 % external cream     escitalopram (LEXAPRO) 5 MG tablet     fluconazole (DIFLUCAN) 150 MG tablet     folic acid (FOLVITE) 1 MG tablet     hydroquinone (MARLON) 4 % external cream     ibuprofen (ADVIL/MOTRIN) 800 MG tablet     methotrexate 2.5 MG tablet      metroNIDAZOLE (FLAGYL) 500 MG tablet     mycophenolate (GENERIC EQUIVALENT) 500 MG tablet     Prenatal Vit-Fe Fumarate-FA (PNV PRENATAL PLUS MULTIVITAMIN) 27-1 MG TABS per tablet     valACYclovir (VALTREX) 500 MG tablet     Current Facility-Administered Medications   Medication     triamcinolone acetonide (KENALOG-10) injection 10 mg      Past Medical History:   Patient Active Problem List   Diagnosis     Labor and delivery, indication for care      (normal spontaneous vaginal delivery)     Morphea     Dermatitis     Encounter for long-term current use of high risk medication     Past Medical History:   Diagnosis Date     Depressive disorder     anxiety     Herpes genitalia         CC Referred Self, MD  No address on file on close of this encounter.

## 2022-08-09 NOTE — LETTER
Date:August 29, 2022      Patient was self referred, no letter generated. Do not send.        [facility] Health Information

## 2022-08-09 NOTE — LETTER
8/9/2022       RE: Cierra Suazo  [address]  [place] [facility] [address]     Dear Colleague,    Thank you for referring your patient, Cierra Suazo, to the [facility] DERMATOLOGY CLINIC [place] at [facility]. Please see a copy of my visit note below.    [facility] Dermatology Note  Encounter Date: Aug 9, 2022  Office Visit     Dermatology Problem List:  1. Morphea  - biopsy on the back at derm surg c/w morphea (per patient) 2/2021  - current: has had x21 txs of UVA1, discharge UV tx   - decrease current dosage to 1500mg daily (3 pills daily), may continue tapering down by 1 pill every 2 to 3 weeks as tolerated  - prior: methotrexate 20 mg q week (morphea was stable but not improving so transitioned to cellcept 9/28/21), fillers ~8 years ago; Kenalog 10 mg lip injection to soften scar; s/p  left lower cutaneous lip  ____________________________________________    Assessment & Plan:    1. Morphea  - biopsy on the back at derm surg c/w morphea (per patient) 2/2021  - current: has had x21 txs of UVA1 ; continues on mycophenolate 1000 BID  -May decrease current dosage to 1500mg daily (3 pills daily), may continue tapering down by 1 pill every 2 to 3 weeks as tolerated  - prior: methotrexate 20 mg q week (morphea was stable but not improving so transitioned to cellcept 9/28/21), fillers ~8 years ago; Kenalog 10 mg lip injection to soften scar; s/p  left lower cutaneous lip  -May continue UVA1 per patient preference  -CBC, CMP ordered today    Procedures Performed:   None      Follow-up: 3 month(s) in-person, or earlier for new or changing lesions    Staff and Medical Student:       Pt seen and discussed with attending physician, Dr Palomares.     Marii Conway, MS4    I was present with the medical student who participated in the service and in the documentation.  I have verified the history and personally performed the  physical exam and medical decision making.  I agree with the assessment and plan of care as documented in the note.      Luis Palomares MD  Dermatology Attending    ____________________________________________    CC: Derm Problem (Cierra is here today for morphea. She states that her skin has been the same. )    HPI:  Ms. Cierra Suazo is a(n) 26 year old female who presents today as a return patient for morphea f/u.  Patient was last seen April 28 2022, at which point patient had left lower cutaneous lip injected with Kenalog.  Patient has since had favorable results.  Patient additionally got  left lower cutaneous lip, flattening out the area.  Today, patient is curious about next steps.  Patient reports that she has not been doing UV light treatments for the past several weeks due to concerns about cost and issues with insurance coverage.  Patient continues to take mycophenolate 1000 twice daily.  Has discontinued clobetasol and calcipotriene, however has started hydroquinone on affected areas.  Patient feels like areas along the back are more of a light brown now.    Patient is otherwise feeling well, without additional skin concerns.    Labs:  CBC , CMP reviewed.    Physical Exam:  Vitals: There were no vitals taken for this visit.  SKIN: Total skin excluding the undergarment areas was performed. The exam included the head/face, neck, both arms, chest, back, abdomen, both legs, digits and/or nails.   -Moderate indentation on the left lower cutaneous lip, improved from prior  - firm skin-colored plaque that is mildly hyperpigmented in a widened geographic distribution located on the mid back crossing midline spanning towards the b/l lateral flank and anterior right torso; no longer has pink tinge, now appears more light brown  - No other lesions of concern on areas examined.     Medications:  Current Outpatient Medications   Medication     calcipotriene (DOVONOX) 0.005 % external cream     acetaminophen  (TYLENOL) 325 MG tablet     clobetasol (TEMOVATE) 0.05 % external cream     escitalopram (LEXAPRO) 5 MG tablet     fluconazole (DIFLUCAN) 150 MG tablet     folic acid (FOLVITE) 1 MG tablet     hydroquinone (MARLON) 4 % external cream     ibuprofen (ADVIL/MOTRIN) 800 MG tablet     methotrexate 2.5 MG tablet     metroNIDAZOLE (FLAGYL) 500 MG tablet     mycophenolate (GENERIC EQUIVALENT) 500 MG tablet     Prenatal Vit-Fe Fumarate-FA (PNV PRENATAL PLUS MULTIVITAMIN) 27-1 MG TABS per tablet     valACYclovir (VALTREX) 500 MG tablet     Current Facility-Administered Medications   Medication     triamcinolone acetonide (KENALOG-10) injection 10 mg      Past Medical History:   Patient Active Problem List   Diagnosis     Labor and delivery, indication for care      (normal spontaneous vaginal delivery)     Morphea     Dermatitis     Encounter for long-term current use of high risk medication     Past Medical History:   Diagnosis Date     Depressive disorder     anxiety     Herpes genitalia         CC Referred Self, MD  No address on file on close of this encounter.      Again, thank you for allowing me to participate in the care of your patient.      Sincerely,    Luis Palomares MD

## 2022-08-09 NOTE — NURSING NOTE
Dermatology Rooming Note    Cierra Suazo's goals for this visit include:   Chief Complaint   Patient presents with     Derm Problem     Cierra is here today for morphea. She states that her skin has been the same.      Emma Treadwell, Visit Facilitator

## 2022-08-28 PROBLEM — Z79.899 ENCOUNTER FOR LONG-TERM (CURRENT) USE OF HIGH-RISK MEDICATION: Status: ACTIVE | Noted: 2022-08-28

## 2022-10-22 ENCOUNTER — HEALTH MAINTENANCE LETTER (OUTPATIENT)
Age: 27
End: 2022-10-22

## 2022-12-05 ENCOUNTER — LAB (OUTPATIENT)
Dept: LAB | Facility: CLINIC | Age: 27
End: 2022-12-05
Payer: COMMERCIAL

## 2022-12-05 DIAGNOSIS — Z79.899 ENCOUNTER FOR LONG-TERM (CURRENT) USE OF HIGH-RISK MEDICATION: ICD-10-CM

## 2022-12-05 LAB
ALBUMIN SERPL BCG-MCNC: 4.9 G/DL (ref 3.5–5.2)
ALP SERPL-CCNC: 41 U/L (ref 35–104)
ALT SERPL W P-5'-P-CCNC: 10 U/L (ref 10–35)
ANION GAP SERPL CALCULATED.3IONS-SCNC: 11 MMOL/L (ref 7–15)
AST SERPL W P-5'-P-CCNC: 26 U/L (ref 10–35)
BASOPHILS # BLD AUTO: 0.1 10E3/UL (ref 0–0.2)
BASOPHILS NFR BLD AUTO: 1 %
BILIRUB SERPL-MCNC: 1 MG/DL
BUN SERPL-MCNC: 11.4 MG/DL (ref 6–20)
CALCIUM SERPL-MCNC: 9.9 MG/DL (ref 8.6–10)
CHLORIDE SERPL-SCNC: 104 MMOL/L (ref 98–107)
CREAT SERPL-MCNC: 0.76 MG/DL (ref 0.51–0.95)
DEPRECATED HCO3 PLAS-SCNC: 25 MMOL/L (ref 22–29)
EOSINOPHIL # BLD AUTO: 0.1 10E3/UL (ref 0–0.7)
EOSINOPHIL NFR BLD AUTO: 2 %
ERYTHROCYTE [DISTWIDTH] IN BLOOD BY AUTOMATED COUNT: 13.1 % (ref 10–15)
GFR SERPL CREATININE-BSD FRML MDRD: >90 ML/MIN/1.73M2
GLUCOSE SERPL-MCNC: 65 MG/DL (ref 70–99)
HCT VFR BLD AUTO: 43.2 % (ref 35–47)
HGB BLD-MCNC: 13.9 G/DL (ref 11.7–15.7)
IMM GRANULOCYTES # BLD: 0 10E3/UL
IMM GRANULOCYTES NFR BLD: 0 %
LYMPHOCYTES # BLD AUTO: 1.8 10E3/UL (ref 0.8–5.3)
LYMPHOCYTES NFR BLD AUTO: 29 %
MCH RBC QN AUTO: 29 PG (ref 26.5–33)
MCHC RBC AUTO-ENTMCNC: 32.2 G/DL (ref 31.5–36.5)
MCV RBC AUTO: 90 FL (ref 78–100)
MONOCYTES # BLD AUTO: 0.5 10E3/UL (ref 0–1.3)
MONOCYTES NFR BLD AUTO: 8 %
NEUTROPHILS # BLD AUTO: 3.7 10E3/UL (ref 1.6–8.3)
NEUTROPHILS NFR BLD AUTO: 60 %
PLATELET # BLD AUTO: 280 10E3/UL (ref 150–450)
POTASSIUM SERPL-SCNC: 4 MMOL/L (ref 3.4–5.3)
PROT SERPL-MCNC: 7.4 G/DL (ref 6.4–8.3)
RBC # BLD AUTO: 4.8 10E6/UL (ref 3.8–5.2)
SODIUM SERPL-SCNC: 140 MMOL/L (ref 136–145)
WBC # BLD AUTO: 6.2 10E3/UL (ref 4–11)

## 2022-12-05 PROCEDURE — 80053 COMPREHEN METABOLIC PANEL: CPT

## 2022-12-05 PROCEDURE — 85025 COMPLETE CBC W/AUTO DIFF WBC: CPT

## 2022-12-05 PROCEDURE — 36415 COLL VENOUS BLD VENIPUNCTURE: CPT

## 2022-12-10 ENCOUNTER — HEALTH MAINTENANCE LETTER (OUTPATIENT)
Age: 27
End: 2022-12-10

## 2023-01-12 ENCOUNTER — OFFICE VISIT (OUTPATIENT)
Dept: DERMATOLOGY | Facility: CLINIC | Age: 28
End: 2023-01-12
Payer: COMMERCIAL

## 2023-01-12 DIAGNOSIS — L94.0 MORPHEA: ICD-10-CM

## 2023-01-12 DIAGNOSIS — Z79.899 ENCOUNTER FOR LONG-TERM CURRENT USE OF HIGH RISK MEDICATION: Primary | ICD-10-CM

## 2023-01-12 PROCEDURE — 11900 INJECT SKIN LESIONS </W 7: CPT | Mod: GC | Performed by: DERMATOLOGY

## 2023-01-12 RX ORDER — PREDNISONE 10 MG/1
TABLET ORAL
Qty: 21 TABLET | Refills: 0 | Status: SHIPPED | OUTPATIENT
Start: 2023-01-12 | End: 2023-01-26

## 2023-01-12 RX ORDER — MYCOPHENOLATE MOFETIL 500 MG/1
1500 TABLET ORAL 2 TIMES DAILY
Qty: 540 TABLET | Refills: 3 | Status: SHIPPED | OUTPATIENT
Start: 2023-01-12

## 2023-01-12 ASSESSMENT — PAIN SCALES - GENERAL: PAINLEVEL: NO PAIN (0)

## 2023-01-12 NOTE — PROGRESS NOTES
Drug Administration Record    Prior to injection, verified patient identity using patient's name and date of birth.  Due to injection administration, patient instructed to remain in clinic for 15 minutes  afterwards, and to report any adverse reaction to me immediately.    Drug Name: triamcinolone acetonide(kenalog)  Dose: 0.25mL of triamcinolone 5mg/mL, 1.25mg dose  Route administered: ID  NDC #: Kenalog-10 (2060-2728-96)  Amount of waste(mL):0.25  Reason for waste: Single use vial    LOT #: [id number]  SITE: see note  : [email or web address]  EXPIRATION DATE: 06/2024

## 2023-01-12 NOTE — PROGRESS NOTES
[facility] Dermatology Note  Encounter Date: Jan 12, 2023  Office Visit     Dermatology Problem List:  1. Morphea  - biopsy on the back at derm surg c/w morphea (per patient) 2/2021  - current: MMF 1.5g BID, last ILK5 01/12/23   - prior: UVA1 x21tx (no longer covered by insurance),  methotrexate 20 mg q week (morphea was stable but not improving so transitioned to cellcept 9/28/21), fillers ~8 years ago; Kenalog 10 mg lip injection to soften scar; s/p  left lower cutaneous lip  ____________________________________________    Assessment & Plan:   1. Morphea.  Chronic problem, active, currently flaring.  Patient with longstanding complicated history of morphea that has been on numerous treatments thus far.  Unfortunately she reports that morphea on her lower jaw has been flaring as of recent.  Plan at last visit was to taper down on her mycophenolate but she saw an outside dermatologist who also felt that her symptoms are flaring. Has since resumed MMF 1g daily. Her lower jaw does feel more indurated on exam that may be contributing to her visualized indentation of her chin/lip.  Discussed that we would treat aggressively to avoid further deformity and start with prednisone 20 mg daily for 1 week followed by 10 mg daily for 1 week, increase mycophenolate to 1.5 g twice daily, and intralesional corticosteroids today.  Patient expressed understanding.  - Start prednisone 20mg daily for 1 week and 10 mg daily for 1 week  - Increase mycophenolate to 1.5g twice daily  - ILK-5 today (see below)  - Consider home UVA1 at follow up (UVA1 not covered by insurance anymore)  - CBC/CMP in 2 months    Procedures Performed:   - Intra-lesional triamcinolone procedure note. After verbal consent and review of risk of pain and skin thinning/atrophy, positioning and cleansing with isopropyl alcohol, 0.25 total mL of triamcinolone 5 mg/mL was injected into 3 lesion(s) on the lip. The patient tolerated the  procedure well and left the dermatology clinic in good condition.    None    Follow-up: 2 months with repeat labs at that time, prn for new or changing lesions    Staff and Resident:     Daniel Richard MD  PGY-3 Dermatology  Pager: [phone]    I have seen and examined this patient and agree with the assessment and plan as documented in the resident's note, and was present for all procedures.    Luis Palomares MD  Dermatology Attending  ____________________________________________    CC: Derm Problem (3m follow up Morphea- Cierra states it may have reactivated and had gotten worse)    HPI:  Ms. Cierra Suazo is a(n) 27 year old female who presents today as a return patient for morphea.    Patient returns today for follow-up on her morphea.  She states that it might have reactivated has gotten worse.  She has noted a little bit more deformity of her lower jaw/chin especially when she smiles.  It feels slightly more hard to her.  After her last visit she was recommended to taper down her mycophenolate but she saw Dr. Thompson her outside dermatologist who thought that this represented a flaring of her condition and recommended biopsy.  She decided to defer this biopsy and restarted herself on her mycophenolate 1 g twice daily.  She has been tolerating it well without any side effects and is due for repeat labs in 2 months.  She is unsure whether or not her morphea on her back has been flaring as well.  She had her filler performed in July, saw us in August, and saw Dr. Thompson in September.  She wonders if this could be related to her filler injections.  She otherwise been feeling well without any additional skin concerns at this time.    - Patient is otherwise feeling well, without additional skin concerns.    Labs Reviewed:  N/A    Physical Exam:  Vitals: There were no vitals taken for this visit.  SKIN: Focused examination of back and face was performed.  - Left lower cutaneous lip/jawline with firm slightly indurated plaque  with indentation  - Mildly hyperpigmented and light pink plaques without violaceous borders on the back  - No other lesions of concern on areas examined.     Medications:  Current Outpatient Medications   Medication     calcipotriene (DOVONOX) 0.005 % external cream     hydroquinone (MARLON) 4 % external cream     acetaminophen (TYLENOL) 325 MG tablet     clobetasol (TEMOVATE) 0.05 % external cream     escitalopram (LEXAPRO) 5 MG tablet     fluconazole (DIFLUCAN) 150 MG tablet     folic acid (FOLVITE) 1 MG tablet     ibuprofen (ADVIL/MOTRIN) 800 MG tablet     methotrexate 2.5 MG tablet     metroNIDAZOLE (FLAGYL) 500 MG tablet     mycophenolate (GENERIC EQUIVALENT) 500 MG tablet     Prenatal Vit-Fe Fumarate-FA (PNV PRENATAL PLUS MULTIVITAMIN) 27-1 MG TABS per tablet     valACYclovir (VALTREX) 500 MG tablet     Current Facility-Administered Medications   Medication     triamcinolone acetonide (KENALOG-10) injection 10 mg      Past Medical History:   Patient Active Problem List   Diagnosis     Labor and delivery, indication for care      (normal spontaneous vaginal delivery)     Morphea     Dermatitis     Encounter for long-term current use of high risk medication     Encounter for long-term (current) use of high-risk medication     Past Medical History:   Diagnosis Date     Depressive disorder     anxiety     Herpes genitalia        CC Referred Self, MD  No address on file on close of this encounter.

## 2023-01-12 NOTE — LETTER
1/12/2023       RE: Cierra Suazo  [address]  [facility] [place] [address]     Dear Colleague,    Thank you for referring your patient, Cierra Suazo, to the [facility] DERMATOLOGY CLINIC [place] at [facility]. Please see a copy of my visit note below.    [facility] Dermatology Note  Encounter Date: Jan 12, 2023  Office Visit     Dermatology Problem List:  1. Morphea  - biopsy on the back at derm surg c/w morphea (per patient) 2/2021  - current: MMF 1.5g BID, last ILK5 01/12/23   - prior: UVA1 x21tx (no longer covered by insurance),  methotrexate 20 mg q week (morphea was stable but not improving so transitioned to cellcept 9/28/21), fillers ~8 years ago; Kenalog 10 mg lip injection to soften scar; s/p  left lower cutaneous lip  ____________________________________________    Assessment & Plan:   1. Morphea.  Chronic problem, active, currently flaring.  Patient with longstanding complicated history of morphea that has been on numerous treatments thus far.  Unfortunately she reports that morphea on her lower jaw has been flaring as of recent.  Plan at last visit was to taper down on her mycophenolate but she saw an outside dermatologist who also felt that her symptoms are flaring. Has since resumed MMF 1g daily. Her lower jaw does feel more indurated on exam that may be contributing to her visualized indentation of her chin/lip.  Discussed that we would treat aggressively to avoid further deformity and start with prednisone 20 mg daily for 1 week followed by 10 mg daily for 1 week, increase mycophenolate to 1.5 g twice daily, and intralesional corticosteroids today.  Patient expressed understanding.  - Start prednisone 20mg daily for 1 week and 10 mg daily for 1 week  - Increase mycophenolate to 1.5g twice daily  - ILK-5 today (see below)  - Consider home UVA1 at follow up (UVA1 not covered by insurance anymore)  - CBC/CMP in 2  months    Procedures Performed:   - Intra-lesional triamcinolone procedure note. After verbal consent and review of risk of pain and skin thinning/atrophy, positioning and cleansing with isopropyl alcohol, 0.25 total mL of triamcinolone 5 mg/mL was injected into 3 lesion(s) on the lip. The patient tolerated the procedure well and left the dermatology clinic in good condition.    None    Follow-up: 2 months with repeat labs at that time, prn for new or changing lesions    Staff and Resident:     Daniel Richard MD  PGY-3 Dermatology  Pager: [phone]    I have seen and examined this patient and agree with the assessment and plan as documented in the resident's note, and was present for all procedures.    Luis Palomares MD  Dermatology Attending  ____________________________________________    CC: Derm Problem (3m follow up Morphea- Cierra states it may have reactivated and had gotten worse)    HPI:  Ms. Cierra Suazo is a(n) 27 year old female who presents today as a return patient for morphea.    Patient returns today for follow-up on her morphea.  She states that it might have reactivated has gotten worse.  She has noted a little bit more deformity of her lower jaw/chin especially when she smiles.  It feels slightly more hard to her.  After her last visit she was recommended to taper down her mycophenolate but she saw Dr. Thompson her outside dermatologist who thought that this represented a flaring of her condition and recommended biopsy.  She decided to defer this biopsy and restarted herself on her mycophenolate 1 g twice daily.  She has been tolerating it well without any side effects and is due for repeat labs in 2 months.  She is unsure whether or not her morphea on her back has been flaring as well.  She had her filler performed in July, saw us in August, and saw Dr. Thompson in September.  She wonders if this could be related to her filler injections.  She otherwise been feeling well without any additional skin concerns at  this time.    - Patient is otherwise feeling well, without additional skin concerns.    Labs Reviewed:  N/A    Physical Exam:  Vitals: There were no vitals taken for this visit.  SKIN: Focused examination of back and face was performed.  - Left lower cutaneous lip/jawline with firm slightly indurated plaque with indentation  - Mildly hyperpigmented and light pink plaques without violaceous borders on the back  - No other lesions of concern on areas examined.     Medications:  Current Outpatient Medications   Medication     calcipotriene (DOVONOX) 0.005 % external cream     hydroquinone (MARLON) 4 % external cream     acetaminophen (TYLENOL) 325 MG tablet     clobetasol (TEMOVATE) 0.05 % external cream     escitalopram (LEXAPRO) 5 MG tablet     fluconazole (DIFLUCAN) 150 MG tablet     folic acid (FOLVITE) 1 MG tablet     ibuprofen (ADVIL/MOTRIN) 800 MG tablet     methotrexate 2.5 MG tablet     metroNIDAZOLE (FLAGYL) 500 MG tablet     mycophenolate (GENERIC EQUIVALENT) 500 MG tablet     Prenatal Vit-Fe Fumarate-FA (PNV PRENATAL PLUS MULTIVITAMIN) 27-1 MG TABS per tablet     valACYclovir (VALTREX) 500 MG tablet     Current Facility-Administered Medications   Medication     triamcinolone acetonide (KENALOG-10) injection 10 mg      Past Medical History:   Patient Active Problem List   Diagnosis     Labor and delivery, indication for care      (normal spontaneous vaginal delivery)     Morphea     Dermatitis     Encounter for long-term current use of high risk medication     Encounter for long-term (current) use of high-risk medication     Past Medical History:   Diagnosis Date     Depressive disorder     anxiety     Herpes genitalia        CC Referred Self, MD  No address on file on close of this encounter.    Drug Administration Record    Prior to injection, verified patient identity using patient's name and date of birth.  Due to injection administration, patient instructed to remain in clinic for 15 minutes   afterwards, and to report any adverse reaction to me immediately.    Drug Name: triamcinolone acetonide(kenalog)  Dose: 0.25mL of triamcinolone 5mg/mL, 1.25mg dose  Route administered: ID  NDC #: Kenalog-10 (0355-6393-63)  Amount of waste(mL):0.25  Reason for waste: Single use vial    LOT #: [id number]  SITE: see note  : [employer]  EXPIRATION DATE: 06/2024      Again, thank you for allowing me to participate in the care of your patient.      Sincerely,    Luis Palomares MD

## 2023-01-12 NOTE — PATIENT INSTRUCTIONS
- Start prednisone 20mg daily for 1 week and 10 mg daily for 1 week  - Increase mycophenolate to 1.5g twice daily  - Injection today

## 2023-01-12 NOTE — NURSING NOTE
Dermatology Rooming Note    Cierra Suazo's goals for this visit include:   Chief Complaint   Patient presents with     Derm Problem     3m follow up Kait Norton states it may have reactivated and had gotten worse     Stanley Ferrer, Visit Facilitator

## 2023-01-12 NOTE — LETTER
Date:January 19, 2023      Provider requested that no letter be sent. Do not send.       [facility]

## 2023-02-14 DIAGNOSIS — L94.0 MORPHEA: Primary | ICD-10-CM

## 2023-02-20 ENCOUNTER — OFFICE VISIT (OUTPATIENT)
Dept: DERMATOLOGY | Facility: CLINIC | Age: 28
End: 2023-02-20
Payer: COMMERCIAL

## 2023-02-20 DIAGNOSIS — L94.0 MORPHEA: ICD-10-CM

## 2023-02-20 PROCEDURE — 96900 ACTINOTHERAPY UV LIGHT: CPT | Performed by: DERMATOLOGY

## 2023-02-20 NOTE — PROGRESS NOTES
Cierra Suazo comes into clinic today at the request of Dr. Palomares, ordering provider for phototherapy.    This service provided today was under the supervising provider of the day Dr. Liao, who was available if needed.    [facility] Dermatology Phototherapy Record  1. Cierra Suazo is a 27 year old female is here today for phototherapy (UVA-1) treatment for Morphea.        Changes or new medications since last treatment (If yes, notify MD):  NO    New medical conditions (If yes, notify MD):  NO    Any problems with last phototherapy treatment (If yes, notify MD)?  NO    Patient denies any remaining skin redness since last treatment (If no, do not treat. Do not treat red skin):  YES    Did staff apply any topicals on patient?  NO     Did patient self apply any topicals?  NO      2. The patient tolerated phototherapy without complication.    Patient will return per protocol for next UVA-1 treatment, per protocol.     Patient to see provider every 4-12 weeks for follow-up during treatment (if no, notify treating physician):  YES.     All questions and concerns discussed with patient in clinic today.    Rosana Burger, CMA

## 2023-02-24 ENCOUNTER — OFFICE VISIT (OUTPATIENT)
Dept: DERMATOLOGY | Facility: CLINIC | Age: 28
End: 2023-02-24
Payer: COMMERCIAL

## 2023-02-24 DIAGNOSIS — L94.0 MORPHEA: ICD-10-CM

## 2023-02-24 PROCEDURE — 96900 ACTINOTHERAPY UV LIGHT: CPT | Performed by: DERMATOLOGY

## 2023-02-24 NOTE — PROGRESS NOTES
Cierra Suazo comes into clinic today at the request of Dr. Palomares, ordering provider for phototherapy.     This service provided today was under the supervising provider of the day Dr. Monson, who was available if needed.     [facility] Dermatology Phototherapy Record  1. Cierra Suazo is a 27 year old female is here today for phototherapy (UVA-1) treatment for Morphea.         Changes or new medications since last treatment (If yes, notify MD):  NO    New medical conditions (If yes, notify MD):  NO    Any problems with last phototherapy treatment (If yes, notify MD)?  NO    Patient denies any remaining skin redness since last treatment (If no, do not treat. Do not treat red skin):  YES    Did staff apply any topicals on patient?  NO     Did patient self apply any topicals?  NO       2. The patient tolerated phototherapy without complication.  - Patient will return per protocol for next UVA-1 treatment, per protocol.   - Patient to see provider every 4-12 weeks for follow-up during treatment (if no, notify treating physician):  YES.   - All questions and concerns discussed with patient in clinic today.     Rosana Burger, CMA

## 2023-03-01 ENCOUNTER — OFFICE VISIT (OUTPATIENT)
Dept: DERMATOLOGY | Facility: CLINIC | Age: 28
End: 2023-03-01
Payer: COMMERCIAL

## 2023-03-01 DIAGNOSIS — L94.0 MORPHEA: ICD-10-CM

## 2023-03-01 PROCEDURE — 96900 ACTINOTHERAPY UV LIGHT: CPT | Performed by: DERMATOLOGY

## 2023-03-01 NOTE — PROGRESS NOTES
Cierra Suazo comes into clinic today at the request of Dr. Palomares, ordering provider for phototherapy.    This service provided today was under the supervising provider of the day Dr. Liao, who was available if needed.    [facility] Dermatology Phototherapy Record  1. Cierra Suaoz is a 27 year old female is here today for phototherapy (UVB) treatment for Morphea [L94.0]  - Primary .        Changes or new medications since last treatment (If yes, notify MD):  NO    New medical conditions (If yes, notify MD):  NO    Any problems with last phototherapy treatment (If yes, notify MD)?  NO    Patient denies any remaining skin redness since last treatment (If no, do not treat. Do not treat red skin):  YES    Did staff apply any topicals on patient?  NO  If yes, which topical?      Did patient self apply any topicals?  NO  If yes, which topical?      The patient was offered umdermhuvbhandfoot or umdermhuvbfullbody AVS : N/A.     2. The patient tolerated phototherapy without complication.    Patient will return per protocol for next UVB treatment, per protocol.     Patient to see provider every 4-12 weeks for follow-up during treatment (if no, notify treating physician):  YES.     All questions and concerns discussed with patient in clinic today.    Delmar Lincoln

## 2023-03-03 ENCOUNTER — OFFICE VISIT (OUTPATIENT)
Dept: DERMATOLOGY | Facility: CLINIC | Age: 28
End: 2023-03-03
Payer: COMMERCIAL

## 2023-03-03 DIAGNOSIS — L94.0 MORPHEA: ICD-10-CM

## 2023-03-03 PROCEDURE — 96900 ACTINOTHERAPY UV LIGHT: CPT | Performed by: DERMATOLOGY

## 2023-03-03 NOTE — PROGRESS NOTES
Cierra Suazo comes into clinic today at the request of Dr. Palomares, ordering provider for phototherapy.    This service provided today was under the supervising provider of the day Dr. Monson, who was available if needed.    [facility] Dermatology Phototherapy Record  1. Cierra Suazo is a 27 year old female is here today for phototherapy (UVB) treatment for Morphea.        Changes or new medications since last treatment (If yes, notify MD):  NO    New medical conditions (If yes, notify MD):  NO    Any problems with last phototherapy treatment (If yes, notify MD)?  NO    Patient denies any remaining skin redness since last treatment (If no, do not treat. Do not treat red skin):  YES    Did staff apply any topicals on patient?  NO  If yes, which topical?      Did patient self apply any topicals?  NO  If yes, which topical?      The patient was offered umdermhuvbhandfoot or umdermhuvbfullbody AVS : N/A.     2. The patient tolerated phototherapy without complication.    Patient will return per protocol for next UVB treatment, per protocol.     Patient to see provider every 4-12 weeks for follow-up during treatment (if no, notify treating physician):  YES.     All questions and concerns discussed with patient in clinic today.    Yeimi Hernandez RN

## 2023-03-07 ENCOUNTER — OFFICE VISIT (OUTPATIENT)
Dept: DERMATOLOGY | Facility: CLINIC | Age: 28
End: 2023-03-07
Payer: COMMERCIAL

## 2023-03-07 DIAGNOSIS — L94.0 MORPHEA: ICD-10-CM

## 2023-03-07 PROCEDURE — 96900 ACTINOTHERAPY UV LIGHT: CPT | Performed by: DERMATOLOGY

## 2023-03-07 NOTE — PROGRESS NOTES
Cierra Suazo comes into clinic today at the request of Dr. Palomares, ordering provider for phototherapy.    This service provided today was under the supervising provider of the day Dr. Liao, who was available if needed.    [facility] Dermatology Phototherapy Record  1. Cierra Suazo is a 27 year old female is here today for phototherapy (UVA) treatment for Morphea.        Changes or new medications since last treatment (If yes, notify MD):  NO    New medical conditions (If yes, notify MD):  NO    Any problems with last phototherapy treatment (If yes, notify MD)?  NO    Patient denies any remaining skin redness since last treatment (If no, do not treat. Do not treat red skin):  YES    Did staff apply any topicals on patient?  NO  If yes, which topical?      Did patient self apply any topicals?  NO  If yes, which topical?      The patient was offered umdermhuvbhandfoot or umdermhuvbfullbody AVS : N/A.     2. The patient tolerated phototherapy without complication.    Patient will return per protocol for next UVB treatment, per protocol.     Patient to see provider every 4-12 weeks for follow-up during treatment (if no, notify treating physician):  YES.     All questions and concerns discussed with patient in clinic today.    Yeimi Hernandez RN

## 2023-03-10 ENCOUNTER — OFFICE VISIT (OUTPATIENT)
Dept: DERMATOLOGY | Facility: CLINIC | Age: 28
End: 2023-03-10
Payer: COMMERCIAL

## 2023-03-10 DIAGNOSIS — L94.0 MORPHEA: ICD-10-CM

## 2023-03-10 PROCEDURE — 96900 ACTINOTHERAPY UV LIGHT: CPT | Performed by: DERMATOLOGY

## 2023-03-10 NOTE — PROGRESS NOTES
Cierra Suazo comes into clinic today at the request of Dr. Palomares, ordering provider for phototherapy.    This service provided today was under the supervising provider of the day Dr. Monson, who was available if needed.    [facility] Dermatology Phototherapy Record  1. Cierra Suazo is a 27 year old female is here today for phototherapy (UVA) treatment for Morphea.        Changes or new medications since last treatment (If yes, notify MD):  NO    New medical conditions (If yes, notify MD):  NO    Any problems with last phototherapy treatment (If yes, notify MD)?  NO    Patient denies any remaining skin redness since last treatment (If no, do not treat. Do not treat red skin):  YES    Did staff apply any topicals on patient?  NO  If yes, which topical?      Did patient self apply any topicals?  NO  If yes, which topical?      The patient was offered umdermhuvbhandfoot or umdermhuvbfullbody AVS : N/A.     2. The patient tolerated phototherapy without complication.    Patient will return per protocol for next UVB treatment, per protocol.     Patient to see provider every 4-12 weeks for follow-up during treatment (if no, notify treating physician):  YES.     All questions and concerns discussed with patient in clinic today.    Yeimi Hernandez RN

## 2023-03-13 NOTE — PATIENT INSTRUCTIONS
Plan:  - Immediately decrease mycophelonate from 4 pills daily to 3 pills daily   - May continue to decrease by one pill every 2-3wks as tolerated, until tapered off  - Discussed possibly doing another steroid injection in lower lip, as needed       Follow-up in 3mo  
13-Mar-2023

## 2023-03-21 ENCOUNTER — OFFICE VISIT (OUTPATIENT)
Dept: DERMATOLOGY | Facility: CLINIC | Age: 28
End: 2023-03-21
Payer: COMMERCIAL

## 2023-03-21 DIAGNOSIS — L94.0 MORPHEA: ICD-10-CM

## 2023-03-21 PROCEDURE — 99207 PR NO CHARGE NURSE ONLY: CPT | Performed by: DERMATOLOGY

## 2023-03-21 PROCEDURE — 96900 ACTINOTHERAPY UV LIGHT: CPT | Performed by: DERMATOLOGY

## 2023-03-21 NOTE — PROGRESS NOTES
Cierra Suazo comes into clinic today at the request of Dr Palomares, ordering provider for phototherapy.    This service provided today was under the supervising provider of the day Dr Monson, who was available if needed.    [facility] Dermatology Phototherapy Record  1. Cierra Suazo is a 27 year old female is here today for phototherapy (UVA-1) treatment for Morphea.        Changes or new medications since last treatment (If yes, notify MD):  NO    New medical conditions (If yes, notify MD):  NO    Any problems with last phototherapy treatment (If yes, notify MD)?  NO    Patient denies any remaining skin redness since last treatment (If no, do not treat. Do not treat red skin):  YES    Did staff apply any topicals on patient?  NO  If yes, which topical?      Did patient self apply any topicals?  NO  If yes, which topical?        2. The patient tolerated phototherapy without complication.    Patient will return per protocol for next UVA treatment, per protocol.     Patient to see provider every 4-12 weeks for follow-up during treatment (if no, notify treating physician):  YES.     All questions and concerns discussed with patient in clinic today.    Leanne Luis RN

## 2023-03-24 ENCOUNTER — OFFICE VISIT (OUTPATIENT)
Dept: DERMATOLOGY | Facility: CLINIC | Age: 28
End: 2023-03-24
Payer: COMMERCIAL

## 2023-03-24 DIAGNOSIS — L94.0 MORPHEA: ICD-10-CM

## 2023-03-24 PROCEDURE — 99207 PR NO CHARGE NURSE ONLY: CPT | Performed by: DERMATOLOGY

## 2023-03-24 PROCEDURE — 96900 ACTINOTHERAPY UV LIGHT: CPT | Performed by: DERMATOLOGY

## 2023-03-24 NOTE — PROGRESS NOTES
Cierra Suazo comes into clinic today at the request of Dr. Palomares, ordering provider for phototherapy.    This service provided today was under the supervising provider of the day Dr. Oliva, who was available if needed.    [facility] Dermatology Phototherapy Record  1. Cierra Suazo is a 27 year old female is here today for phototherapy (UVA-1) treatment for Morphea.        Changes or new medications since last treatment (If yes, notify MD):  NO    New medical conditions (If yes, notify MD):  NO    Any problems with last phototherapy treatment (If yes, notify MD)?  NO    Patient denies any remaining skin redness since last treatment (If no, do not treat. Do not treat red skin):  YES    Did staff apply any topicals on patient?  NO      Did patient self apply any topicals?  NO       2. The patient tolerated phototherapy without complication.    Patient will return per protocol for next UVA-1 treatment, per protocol.     Patient to see provider every 4-12 weeks for follow-up during treatment (if no, notify treating physician):  YES.     All questions and concerns discussed with patient in clinic today.    Rosana Burger, CMA

## 2023-03-27 ENCOUNTER — OFFICE VISIT (OUTPATIENT)
Dept: DERMATOLOGY | Facility: CLINIC | Age: 28
End: 2023-03-27
Payer: COMMERCIAL

## 2023-03-27 DIAGNOSIS — L94.0 MORPHEA: ICD-10-CM

## 2023-03-27 PROCEDURE — 99207 PR NO CHARGE NURSE ONLY: CPT | Performed by: DERMATOLOGY

## 2023-03-27 PROCEDURE — 96900 ACTINOTHERAPY UV LIGHT: CPT | Performed by: DERMATOLOGY

## 2023-03-27 NOTE — PROGRESS NOTES
Cierra Suazo comes into clinic today at the request of Dr. Palomares, ordering provider for phototherapy.    This service provided today was under the supervising provider of the day Dr. Olivia, who was available if needed.    [facility] Dermatology Phototherapy Record  1. Cierra Suazo is a 27 year old female is here today for phototherapy (UVB) treatment for Morphea [L94.0]  - Primary .        Changes or new medications since last treatment (If yes, notify MD):  NO    New medical conditions (If yes, notify MD):  NO    Any problems with last phototherapy treatment (If yes, notify MD)?  NO    Patient denies any remaining skin redness since last treatment (If no, do not treat. Do not treat red skin):  YES    Did staff apply any topicals on patient?  NO  If yes, which topical?      Did patient self apply any topicals?  NO  If yes, which topical?      The patient was offered umdermhuvbhandfoot or umdermhuvbfullbody AVS : N/A.     2. The patient tolerated phototherapy without complication.    Patient will return per protocol for next UVB treatment, per protocol.     Patient to see provider every 4-12 weeks for follow-up during treatment (if no, notify treating physician):  YES.     All questions and concerns discussed with patient in clinic today.    Delmar Lincoln

## 2023-03-28 ENCOUNTER — OFFICE VISIT (OUTPATIENT)
Dept: DERMATOLOGY | Facility: CLINIC | Age: 28
End: 2023-03-28
Payer: COMMERCIAL

## 2023-03-28 DIAGNOSIS — Z79.899 ENCOUNTER FOR LONG-TERM CURRENT USE OF HIGH RISK MEDICATION: ICD-10-CM

## 2023-03-28 DIAGNOSIS — L94.0 MORPHEA: Primary | ICD-10-CM

## 2023-03-28 PROCEDURE — 99214 OFFICE O/P EST MOD 30 MIN: CPT | Mod: 25 | Performed by: DERMATOLOGY

## 2023-03-28 PROCEDURE — 11900 INJECT SKIN LESIONS </W 7: CPT | Mod: GC | Performed by: DERMATOLOGY

## 2023-03-28 ASSESSMENT — PAIN SCALES - GENERAL: PAINLEVEL: NO PAIN (0)

## 2023-03-28 NOTE — PROGRESS NOTES
[facility] Dermatology Note  Encounter Date: Mar 28, 2023  Office Visit     Dermatology Problem List:  1. Morphea  - biopsy on the back at derm surg c/w morphea (per patient) 2/2021  - current: MMF 1.5g BID, last ILK5 01/12/23, UVA1 (paying out of pocket/HSA)   - prior: UVA1 x21tx (no longer covered by insurance),  methotrexate 20 mg q week (morphea was stable but not improving so transitioned to cellcept 9/28/21), fillers ~8 years ago; Kenalog 10 mg lip injection to soften scar; s/p  left lower cutaneous lip    ____________________________________________    Assessment & Plan:     # Morphea of the face and back.  Chronic, active, flaring.  Currently not a treatment goal with persistent firm plaque of the lower cutaneous lip, midline, however slightly improved after treatment with intralesional triamcinolone at her last visit.  Discussed that we can maximize her dose of mycophenolate to 3500 mg daily, presuming that her safety labs are within normal range.  She did express concerns today regarding fatigue, so we would like to make sure she is not having any cytopenias from this medication.  Additionally, we will repeat a low-dose of intralesional triamcinolone today and schedule a consultation with our rheumatology-dermatology physician, Dr. Randell Olivia, to discuss other potential treatment options.  - IL-K injections performed today (see procedure note(s) below).  - Continue UVA1 phototherapy 2-3 times weekly, will try to resubmit to her insurance for coverage  - Increase mycophenolate mofetil to 1500 mg in the morning and 2000 mg in the evening for total max dose 3500 mg  - Safety labs ordered for mycophenolate: CBC, CMP     Procedures Performed:   - Intra-lesional triamcinolone procedure note. After verbal consent and review of risk of pain and skin thinning/atrophy, positioning and cleansing with isopropyl alcohol, 0.2 total mL of triamcinolone 5 mg/mL was injected into 1 lesion(s)  on the lower cutaneous lip/chin. The patient tolerated the procedure well and left the dermatology clinic in good condition.    Follow-up: 3 month(s) in-person, or earlier for new or changing lesions    Staff and Resident:     Andria Heredia DO (PGY-4)  Dermatology Resident  [facility]    Staff: Dr. Palomares    I have seen and examined this patient and agree with the assessment and plan as documented in the resident's note, and was present for all procedures.    Luis Palomares MD  Dermatology Attending    ____________________________________________    CC: Derm Problem (Morphea follow-up: check jaw and back/Has felt more tired than usual)    HPI:  Ms. Cierra Suazo is a(n) 27 year old female who presents today for follow-up  for facial morphea and morphea of the back.  She is currently continuing treatment with mycophenolate 1500 mg twice daily and has restarted UVA 1 phototherapy which she is paying out-of-pocket for with her HSA account.  She was last seen by us 2 months ago and was placed on a short prednisone taper and had IL K of the firm area of the left lower lip where her filler was placed and thinks this has helped slightly but did not like being on the prednisone due to side effects such as insomnia.  She is due to see her primary dermatologist, Dr. Thompson, in a few weeks to discuss the neck steps in treatment as she is still having significant involvement of the face and back.  She does note that she has been less fixated on these morphea lesions because she has been busy with her 2-year-old son and also has been feeling more fatigued as of lately.    Patient is otherwise feeling well, without additional skin concerns.    Labs Reviewed:  N/A    Physical Exam:  Vitals: There were no vitals taken for this visit.  SKIN: Focused examination of face and back was performed.  - Left lower cutaneous lip/jawline with firm slightly indurated plaque with indentation  - Ill-defined pink plaques without  violaceous borders on the upper and lower back bilaterally  - No other lesions of concern on areas examined.     Medications:  Current Outpatient Medications   Medication     acetaminophen (TYLENOL) 325 MG tablet     calcipotriene (DOVONOX) 0.005 % external cream     clobetasol (TEMOVATE) 0.05 % external cream     escitalopram (LEXAPRO) 5 MG tablet     fluconazole (DIFLUCAN) 150 MG tablet     folic acid (FOLVITE) 1 MG tablet     hydroquinone (MARLON) 4 % external cream     ibuprofen (ADVIL/MOTRIN) 800 MG tablet     methotrexate 2.5 MG tablet     metroNIDAZOLE (FLAGYL) 500 MG tablet     mycophenolate (GENERIC EQUIVALENT) 500 MG tablet     Prenatal Vit-Fe Fumarate-FA (PNV PRENATAL PLUS MULTIVITAMIN) 27-1 MG TABS per tablet     valACYclovir (VALTREX) 500 MG tablet     Current Facility-Administered Medications   Medication     triamcinolone acetonide (KENALOG-10) injection 10 mg      Past Medical History:   Patient Active Problem List   Diagnosis     Labor and delivery, indication for care      (normal spontaneous vaginal delivery)     Morphea     Dermatitis     Encounter for long-term current use of high risk medication     Encounter for long-term (current) use of high-risk medication     Past Medical History:   Diagnosis Date     Depressive disorder     anxiety     Herpes genitalia        CC Referred Self, MD  No address on file on close of this encounter.

## 2023-03-28 NOTE — NURSING NOTE
Dermatology Rooming Note    Cierra Suazo's goals for this visit include:   Chief Complaint   Patient presents with     Derm Problem     Morphea follow-up: check jaw and back  Has felt more tired than usual     Mariam Collins LPN

## 2023-03-28 NOTE — LETTER
3/28/2023       RE: Cierra Suazo  [address]  [place] [facility] [place] [address]     Dear Colleague,    Thank you for referring your patient, Cierra Suazo, to the [facility] DERMATOLOGY [facility] [place] at [facility]. Please see a copy of my visit note below.    [facility] Dermatology Note  Encounter Date: Mar 28, 2023  Office Visit     Dermatology Problem List:  1. Morphea  - biopsy on the back at derm surg c/w morphea (per patient) 2/2021  - current: MMF 1.5g BID, last ILK5 01/12/23, UVA1 (paying out of pocket/HSA)   - prior: UVA1 x21tx (no longer covered by insurance),  methotrexate 20 mg q week (morphea was stable but not improving so transitioned to cellcept 9/28/21), fillers ~8 years ago; Kenalog 10 mg lip injection to soften scar; s/p  left lower cutaneous lip    ____________________________________________    Assessment & Plan:     # Morphea of the face and back  Currently not a treatment goal with persistent firm plaque of the lower cutaneous lip, midline, however slightly improved after treatment with intralesional triamcinolone at her last visit.  Discussed that we can maximize her dose of mycophenolate to 3500 mg daily, presuming that her safety labs are within normal range.  She did express concerns today regarding fatigue, so we would like to make sure she is not having any cytopenias from this medication.  Additionally, we will repeat a low-dose of intralesional triamcinolone today and schedule a consultation with our rheumatology-dermatology physician, Dr. Randell Olivia, to discuss other potential treatment options.  - IL-K injections performed today (see procedure note(s) below).  - Continue UVA1 phototherapy 2-3 times weekly, will try to resubmit to her insurance for coverage  - Increase mycophenolate mofetil to 1500 mg in the morning and 2000 mg in the evening for total max dose 3500 mg  - Safety labs ordered for  mycophenolate: CBC, CMP     Procedures Performed:   - Intra-lesional triamcinolone procedure note. After verbal consent and review of risk of pain and skin thinning/atrophy, positioning and cleansing with isopropyl alcohol, 0.2 total mL of triamcinolone 5 mg/mL was injected into 1 lesion(s) on the lower cutaneous lip/chin. The patient tolerated the procedure well and left the dermatology clinic in good condition.    Follow-up: 3 month(s) in-person, or earlier for new or changing lesions    Staff and Resident:     Andria Heredia DO (PGY-4)  Dermatology Resident  [facility]    Staff: Dr. Palomares  ____________________________________________    CC: Derm Problem (Morphea follow-up: check jaw and back/Has felt more tired than usual)    HPI:  Ms. Cierra Suazo is a(n) 27 year old female who presents today for follow-up  for facial morphea and morphea of the back.  She is currently continuing treatment with mycophenolate 1500 mg twice daily and has restarted UVA 1 phototherapy which she is paying out-of-pocket for with her [employer] account.  She was last seen by us 2 months ago and was placed on a short prednisone taper and had IL K of the firm area of the left lower lip where her filler was placed and thinks this has helped slightly but did not like being on the prednisone due to side effects such as insomnia.  She is due to see her primary dermatologist, Dr. Thompson, in a few weeks to discuss the neck steps in treatment as she is still having significant involvement of the face and back.  She does note that she has been less fixated on these morphea lesions because she has been busy with her 2-year-old son and also has been feeling more fatigued as of lately.    Patient is otherwise feeling well, without additional skin concerns.    Labs Reviewed:  N/A    Physical Exam:  Vitals: There were no vitals taken for this visit.  SKIN: Focused examination of face and back was performed.  - Left lower cutaneous lip/jawline  with firm slightly indurated plaque with indentation  - Ill-defined pink plaques without violaceous borders on the upper and lower back bilaterally  - No other lesions of concern on areas examined.     Medications:  Current Outpatient Medications   Medication    acetaminophen (TYLENOL) 325 MG tablet    calcipotriene (DOVONOX) 0.005 % external cream    clobetasol (TEMOVATE) 0.05 % external cream    escitalopram (LEXAPRO) 5 MG tablet    fluconazole (DIFLUCAN) 150 MG tablet    folic acid (FOLVITE) 1 MG tablet    hydroquinone (MARLON) 4 % external cream    ibuprofen (ADVIL/MOTRIN) 800 MG tablet    methotrexate 2.5 MG tablet    metroNIDAZOLE (FLAGYL) 500 MG tablet    mycophenolate (GENERIC EQUIVALENT) 500 MG tablet    Prenatal Vit-Fe Fumarate-FA (PNV PRENATAL PLUS MULTIVITAMIN) 27-1 MG TABS per tablet    valACYclovir (VALTREX) 500 MG tablet     Current Facility-Administered Medications   Medication    triamcinolone acetonide (KENALOG-10) injection 10 mg      Past Medical History:   Patient Active Problem List   Diagnosis    Labor and delivery, indication for care     (normal spontaneous vaginal delivery)    Morphea    Dermatitis    Encounter for long-term current use of high risk medication    Encounter for long-term (current) use of high-risk medication     Past Medical History:   Diagnosis Date    Depressive disorder     anxiety    Herpes genitalia        CC Referred Self, MD  No address on file on close of this encounter.

## 2023-03-28 NOTE — NURSING NOTE
Drug Administration Record    Prior to injection, verified patient identity using patient's name and date of birth.  Due to injection administration, patient instructed to remain in clinic for 15 minutes  afterwards, and to report any adverse reaction to me immediately.    Drug Name: triamcinolone acetonide(kenalog)  Dose: 1mL of triamcinolone 5mg/mL, 5mg dose  Route administered: ID  NDC #: Kenalog-10 (7943-6403-01)  Amount of waste(mL):4ml  Reason for waste: Multi dose vial    LOT #: [id number]  SITE: see provider note  : [employer]  EXPIRATION DATE: 08/2024

## 2023-03-28 NOTE — PATIENT INSTRUCTIONS
- Increase mycophenolate to 1500 mg in the morning (3 pills) and 2000 mg in the evening (4 pills)  - Continue UVA1 treatments as scheduled  - Please have your labs drawn at any [facility] location in the next week or two

## 2023-04-08 NOTE — TELEPHONE ENCOUNTER
FUTURE VISIT INFORMATION      FUTURE VISIT INFORMATION:    Date: 5.12.23    Time: 8:00    Location: [facility] [place]  REFERRAL INFORMATION:    Referring provider:  Jelani    Referring providers clinic:  [facility]    Reason for visit/diagnosis  Morphea. referred by Dr. Palomares    RECORDS REQUESTED FROM:       Clinic name Comments Records Status Imaging Status   [facility] 3.28.23, 1.12.23, 8.9.22 + more with  Palomares Epic Epic                                       
100

## 2023-05-12 ENCOUNTER — PRE VISIT (OUTPATIENT)
Dept: DERMATOLOGY | Facility: CLINIC | Age: 28
End: 2023-05-12

## 2023-08-07 ENCOUNTER — LAB REQUISITION (OUTPATIENT)
Dept: LAB | Facility: CLINIC | Age: 28
End: 2023-08-07

## 2023-08-07 DIAGNOSIS — Z01.419 ENCOUNTER FOR GYNECOLOGICAL EXAMINATION (GENERAL) (ROUTINE) WITHOUT ABNORMAL FINDINGS: ICD-10-CM

## 2023-08-07 PROCEDURE — G0145 SCR C/V CYTO,THINLAYER,RESCR: HCPCS | Performed by: OBSTETRICS & GYNECOLOGY

## 2023-08-09 LAB
BKR LAB AP GYN ADEQUACY: NORMAL
BKR LAB AP GYN INTERPRETATION: NORMAL
BKR LAB AP HPV REFLEX: NORMAL
BKR LAB AP LMP: NORMAL
BKR LAB AP PREVIOUS ABNL DX: NORMAL
BKR LAB AP PREVIOUS ABNORMAL: NORMAL
PATH REPORT.COMMENTS IMP SPEC: NORMAL
PATH REPORT.COMMENTS IMP SPEC: NORMAL
PATH REPORT.RELEVANT HX SPEC: NORMAL

## 2024-01-14 ENCOUNTER — HEALTH MAINTENANCE LETTER (OUTPATIENT)
Age: 29
End: 2024-01-14

## 2025-01-26 ENCOUNTER — HEALTH MAINTENANCE LETTER (OUTPATIENT)
Age: 30
End: 2025-01-26